# Patient Record
Sex: FEMALE | Race: WHITE | NOT HISPANIC OR LATINO | Employment: OTHER | URBAN - NONMETROPOLITAN AREA
[De-identification: names, ages, dates, MRNs, and addresses within clinical notes are randomized per-mention and may not be internally consistent; named-entity substitution may affect disease eponyms.]

---

## 2017-03-16 ENCOUNTER — OFFICE VISIT (OUTPATIENT)
Dept: CARDIOLOGY | Facility: CLINIC | Age: 77
End: 2017-03-16

## 2017-03-16 VITALS
HEART RATE: 64 BPM | DIASTOLIC BLOOD PRESSURE: 66 MMHG | WEIGHT: 199 LBS | BODY MASS INDEX: 33.97 KG/M2 | HEIGHT: 64 IN | SYSTOLIC BLOOD PRESSURE: 120 MMHG

## 2017-03-16 DIAGNOSIS — R94.39 ABNORMAL STRESS TEST: ICD-10-CM

## 2017-03-16 DIAGNOSIS — I10 ESSENTIAL HYPERTENSION: Primary | ICD-10-CM

## 2017-03-16 DIAGNOSIS — E11.9 TYPE 2 DIABETES MELLITUS WITHOUT COMPLICATION, WITHOUT LONG-TERM CURRENT USE OF INSULIN (HCC): ICD-10-CM

## 2017-03-16 DIAGNOSIS — E78.49 OTHER HYPERLIPIDEMIA: ICD-10-CM

## 2017-03-16 PROBLEM — E78.5 HYPERLIPEMIA: Status: ACTIVE | Noted: 2017-03-16

## 2017-03-16 PROCEDURE — 99213 OFFICE O/P EST LOW 20 MIN: CPT | Performed by: NURSE PRACTITIONER

## 2017-03-16 NOTE — PROGRESS NOTES
Chief Complaint   Patient presents with   • Follow-up     6 month follow-up, denies any cardiac symptoms, patient brought medication list with visit. patient was seen in ER for dehydration recieved fluids and is to follow up with PCP tomorrow.       Cardiac Complaints  none      Subjective   Alena Rojo is a 76 y.o. female with a history of abnormal stress test in the past his cardiac catheterization showed completely normal coronaries. She also had renal angiogram and found to also be normal. Her PA pressure had been elevated but did improve with medication management. In February 2015, she underwent total knee replacement without problems. At her last office visit she reported increased shortness of breath and fatigue. Repeat stress test showed possible anterior wall ischemia for which Imdur was recommended. She was unable to tolerate due to causing body aches.  She comes today for follow up and denies any new cardiac concerns.  She denies any chest pain or palpitations.  She does admit to recently being at [facility] ER for dehydration where she received fluids and was sent home.  Labs and refills she reports with PCP.        Cardiac History  Past Surgical History   Procedure Laterality Date   • Other surgical history  07/29/2010     sleep study   • Cardiovascular stress test  06/02/2010     L. Myoview- (Dr. John) Anterior & Lateral Ischemia   • Echo - converted  06/02/2010     (Dr. Durant) EF 55%. RVSP-70 mmHg   • Cath lab procedure  06/18/2010     Normal coronaries, renals. PA-50mmHg   • Echo - converted  03/25/2013     EF 65% RVSP 38 mmHg   • Cardiovascular stress test  03/14/2016     EF 65%, possible anterior ischemia, imdur added       Current Outpatient Prescriptions   Medication Sig Dispense Refill   • acetaminophen (TYLENOL) 325 MG tablet Take 650 mg by mouth as needed for mild pain (1-3).     • furosemide (LASIX) 40 MG tablet Take 40 mg by mouth daily.     • metFORMIN (GLUCOPHAGE) 500 MG tablet Take 500  "mg by mouth 2 (two) times a day with meals.     • NIFEdipine XL (PROCARDIA XL) 60 MG 24 hr tablet Take 60 mg by mouth daily.     • potassium chloride (K-DUR,KLOR-CON) 10 MEQ CR tablet Take 10 mEq by mouth daily.     • pravastatin (PRAVACHOL) 40 MG tablet Take 40 mg by mouth daily.     • valsartan (DIOVAN) 320 MG tablet Take 320 mg by mouth daily.       No current facility-administered medications for this visit.        Other    Past Medical History   Diagnosis Date   • Arthritis    • Celiac disease/sprue    • H/O colonoscopy      2004- 2005 -- no polyps   • H/O total knee replacement      bilateral   • H/O: hysterectomy    • Hx of cholecystectomy 06/01/2016   • Hypercholesterolemia    • Hyperlipidemia    • Hypertension    • Sleep disorder 07/29/2010     Sleep study       Social History     Social History   • Marital status:      Spouse name: N/A   • Number of children: N/A   • Years of education: N/A     Occupational History   • Not on file.     Social History Main Topics   • Smoking status: Never Smoker   • Smokeless tobacco: Never Used   • Alcohol use No   • Drug use: No   • Sexual activity: Not on file     Other Topics Concern   • Not on file     Social History Narrative       Family History   Problem Relation Age of Onset   • Hypertension Mother    • Cancer Mother    • Heart disease Mother    • Heart attack Father    • Heart disease Father    • Cirrhosis Father    • Hypertension Son    • Hypertension Son        Review of Systems   Constitutional: Negative.    HENT: Negative.    Respiratory: Negative.    Cardiovascular: Negative.    Musculoskeletal: Negative.    Skin: Negative.    Psychiatric/Behavioral: Negative.        DiabetesYes  Thyroidnormal    Objective     Visit Vitals   • /66   • Pulse 64   • Ht 64\" (162.6 cm)   • Wt 199 lb (90.3 kg)   • BMI 34.16 kg/m2       Physical Exam   Constitutional: She is oriented to person, place, and time. She appears well-developed and well-nourished.   HENT: "   Head: Normocephalic and atraumatic.   Eyes: EOM are normal. Pupils are equal, round, and reactive to light.   Neck: Normal range of motion. Neck supple.   Cardiovascular: Normal rate and regular rhythm.    Murmur heard.  Pulmonary/Chest: Effort normal and breath sounds normal.   Abdominal: Soft.   Musculoskeletal: Normal range of motion.   Neurological: She is alert and oriented to person, place, and time.   Skin: Skin is warm and dry.   Psychiatric: She has a normal mood and affect.       Procedures    Assessment/Plan     HR and BP are stable today.  No changes to meds will be made. No new cardiac testing will be advised today as no new concerns are voiced.  Labs and refills she reports with you, could we get next copy please?  No new cardiac testing advised today as no new concerns voiced.  Good cardiac diet and activity as tolerated advised.  6 month follow up scheduled or sooner if needed.      Problems Addressed this Visit        Cardiovascular and Mediastinum    Abnormal stress test    Hyperlipemia    HTN (hypertension) - Primary       Endocrine    Type 2 diabetes mellitus without complication, without long-term current use of insulin                  Electronically signed by PIETRO Guerrero March 16, 2017 4:51 PM

## 2017-09-18 ENCOUNTER — OFFICE VISIT (OUTPATIENT)
Dept: CARDIOLOGY | Facility: CLINIC | Age: 77
End: 2017-09-18

## 2017-09-18 VITALS
DIASTOLIC BLOOD PRESSURE: 68 MMHG | HEART RATE: 52 BPM | WEIGHT: 204 LBS | SYSTOLIC BLOOD PRESSURE: 110 MMHG | BODY MASS INDEX: 34.83 KG/M2 | HEIGHT: 64 IN

## 2017-09-18 DIAGNOSIS — I10 ESSENTIAL HYPERTENSION: Primary | ICD-10-CM

## 2017-09-18 DIAGNOSIS — R94.39 ABNORMAL STRESS TEST: ICD-10-CM

## 2017-09-18 DIAGNOSIS — E78.00 PURE HYPERCHOLESTEROLEMIA: ICD-10-CM

## 2017-09-18 DIAGNOSIS — R60.0 LOCALIZED EDEMA: ICD-10-CM

## 2017-09-18 PROCEDURE — 99213 OFFICE O/P EST LOW 20 MIN: CPT | Performed by: NURSE PRACTITIONER

## 2017-09-18 RX ORDER — DICYCLOMINE HCL 20 MG
20 TABLET ORAL DAILY
COMMUNITY
End: 2018-09-18 | Stop reason: ALTCHOICE

## 2017-09-18 NOTE — PROGRESS NOTES
Chief Complaint   Patient presents with   • Follow-up     Denies chest pain, shortness of breath, or palpitations. PCP refills meds. Labs per nephrologist in August.        Subjective       Alena Rojo is a 76 y.o. female with a history of abnormal stress test in the past followed by cardiac catheterization taht showed completely normal coronaries. She also had renal angiogram and found to also be normal. Her PA pressure had been elevated but did improve with medication management. In February 2015, she underwent total knee replacement without problems. She later reported increased shortness of breath and fatigue. A stress test was repeated in 2016 that showed possible anterior wall ischemia for which Imdur was recommended. She was unable to tolerate due to causing body aches. At her last office visit she felt she was doing well and continued conservative management.   Today she comes to the office for a follow up appointment and denies chest pain, palpitations or significant shortness of breath. Apparently, after walking back to her house from her mailbox is up grade and she develops shortness of breath for which she has to stop one time and rest, but this is not a new symptom. No recent medication changes reported. She also has mild edema of her lower legs, which again is not a new symptom. After daily dose of Lasix the edema improves.     HPI         Cardiac History:    Past Surgical History:   Procedure Laterality Date   • CARDIOVASCULAR STRESS TEST  06/02/2010    NANY Lazaroview- (Dr. John) Anterior & Lateral Ischemia   • CARDIOVASCULAR STRESS TEST  03/14/2016    EF 65%, possible anterior ischemia, imdur added   • CATH LAB PROCEDURE  06/18/2010    Normal coronaries, renals. PA-50mmHg   • ECHO - CONVERTED  06/02/2010    (Dr. Durant) EF 55%. RVSP-70 mmHg   • ECHO - CONVERTED  03/25/2013    EF 65% RVSP 38 mmHg   • OTHER SURGICAL HISTORY  07/29/2010    sleep study       Current Outpatient Prescriptions   Medication  Sig Dispense Refill   • acetaminophen (TYLENOL) 325 MG tablet Take 650 mg by mouth as needed for mild pain (1-3).     • dicyclomine (BENTYL) 20 MG tablet Take 20 mg by mouth Daily.     • furosemide (LASIX) 40 MG tablet Take 40 mg by mouth daily.     • Loperamide HCl (IMODIUM A-D PO) Take  by mouth Daily.     • metFORMIN (GLUCOPHAGE) 500 MG tablet Take 500 mg by mouth 2 (two) times a day with meals.     • NIFEdipine XL (PROCARDIA XL) 60 MG 24 hr tablet Take 60 mg by mouth daily.     • potassium chloride (K-DUR,KLOR-CON) 10 MEQ CR tablet Take 10 mEq by mouth daily.     • pravastatin (PRAVACHOL) 40 MG tablet Take 40 mg by mouth daily.     • valsartan (DIOVAN) 320 MG tablet Take 320 mg by mouth daily.       No current facility-administered medications for this visit.        Other    Past Medical History:   Diagnosis Date   • Arthritis    • Celiac disease/sprue    • H/O colonoscopy     2004- 2005 -- no polyps   • H/O total knee replacement     bilateral   • H/O: hysterectomy    • Hx of cholecystectomy 06/01/2016   • Hypercholesterolemia    • Hyperlipidemia    • Hypertension    • Sleep disorder 07/29/2010    Sleep study       Social History     Social History   • Marital status:      Spouse name: N/A   • Number of children: N/A   • Years of education: N/A     Occupational History   • Not on file.     Social History Main Topics   • Smoking status: Never Smoker   • Smokeless tobacco: Never Used   • Alcohol use No   • Drug use: No   • Sexual activity: Not on file     Other Topics Concern   • Not on file     Social History Narrative       Family History   Problem Relation Age of Onset   • Hypertension Mother    • Cancer Mother    • Heart disease Mother    • Heart attack Father    • Heart disease Father    • Cirrhosis Father    • Hypertension Son    • Hypertension Son        Review of Systems   Constitution: Negative for decreased appetite, weakness and malaise/fatigue.   HENT: Negative for congestion and sore throat.   "  Eyes: Negative for blurred vision.   Cardiovascular: Positive for dyspnea on exertion and leg swelling. Negative for chest pain, near-syncope and palpitations.   Respiratory: Negative for shortness of breath and sleep disturbances due to breathing.    Endocrine: Negative for cold intolerance and heat intolerance.   Hematologic/Lymphatic: Negative for bleeding problem. Does not bruise/bleed easily.   Skin: Negative for itching and nail changes.   Musculoskeletal: Positive for arthritis. Negative for falls and myalgias.   Gastrointestinal: Positive for diarrhea (history celiac disease). Negative for abdominal pain, dysphagia, heartburn and nausea.   Genitourinary: Negative for dysuria, frequency and hematuria.   Neurological: Negative for dizziness, light-headedness, seizures and vertigo.   Psychiatric/Behavioral: Negative for altered mental status.   Allergic/Immunologic: Negative for hives and persistent infections.    Diabetes- Yes  Thyroid-normal    Objective     /68  Pulse 52  Ht 64\" (162.6 cm)  Wt 204 lb (92.5 kg)  BMI 35.02 kg/m2    Physical Exam   Constitutional: She is oriented to person, place, and time. She appears well-nourished.   HENT:   Head: Normocephalic.   Eyes: Pupils are equal, round, and reactive to light.   Neck: Normal range of motion. Neck supple. No JVD present. Carotid bruit is not present. No thyromegaly present.   Cardiovascular: Normal rate, regular rhythm, S1 normal and S2 normal.    No murmur heard.  Pulmonary/Chest: Breath sounds normal. She has no wheezes. She has no rales.   Abdominal: Soft. Bowel sounds are normal. She exhibits no distension. There is no tenderness.   Musculoskeletal: Normal range of motion. She exhibits no edema.   Neurological: She is alert and oriented to person, place, and time.   Skin: Skin is warm and dry. No rash noted. No pallor.   Psychiatric: She has a normal mood and affect. Her behavior is normal. Judgment and thought content normal.    " Procedures          Assessment/Plan      Alena was seen today for follow-up.    Diagnoses and all orders for this visit:    Essential hypertension    Pure hypercholesterolemia    Abnormal stress test    Localized edema      Ms. Rojo denies new or worsening cardiac symtpoms. Her blood pressure is normal. Her heart rate is 52 and I have asked her to periodically monitor heart rate and call if remains her normal 50-60 resting heart rate. We reviewed her last stress which showed an area of ischemia being managed medically. She admits to having nitrostat, but has not had to take. Should any problems develop she understands to call or go to emergency department. She follows with you for management of labs. I encouraged her on good diet and activity. We will see her back in 6 months or sooner for problems.               Electronically signed by PIETRO Puga,  September 18, 2017 4:11 PM

## 2018-03-19 ENCOUNTER — OFFICE VISIT (OUTPATIENT)
Dept: CARDIOLOGY | Facility: CLINIC | Age: 78
End: 2018-03-19

## 2018-03-19 VITALS
BODY MASS INDEX: 35.51 KG/M2 | SYSTOLIC BLOOD PRESSURE: 108 MMHG | HEIGHT: 64 IN | DIASTOLIC BLOOD PRESSURE: 60 MMHG | HEART RATE: 72 BPM | WEIGHT: 208 LBS

## 2018-03-19 DIAGNOSIS — I10 ESSENTIAL HYPERTENSION: Primary | ICD-10-CM

## 2018-03-19 DIAGNOSIS — I27.20 PHT (PULMONARY HYPERTENSION) (HCC): ICD-10-CM

## 2018-03-19 DIAGNOSIS — K90.0 CELIAC DISEASE: ICD-10-CM

## 2018-03-19 DIAGNOSIS — E11.9 TYPE 2 DIABETES MELLITUS WITHOUT COMPLICATION, WITHOUT LONG-TERM CURRENT USE OF INSULIN (HCC): ICD-10-CM

## 2018-03-19 DIAGNOSIS — R01.1 MURMUR, CARDIAC: ICD-10-CM

## 2018-03-19 DIAGNOSIS — E78.00 HYPERCHOLESTEREMIA: ICD-10-CM

## 2018-03-19 DIAGNOSIS — E88.81 METABOLIC SYNDROME: ICD-10-CM

## 2018-03-19 PROBLEM — E78.5 HYPERLIPEMIA: Status: RESOLVED | Noted: 2017-03-16 | Resolved: 2018-03-19

## 2018-03-19 PROBLEM — E88.810 METABOLIC SYNDROME: Status: ACTIVE | Noted: 2018-03-19

## 2018-03-19 PROCEDURE — 99213 OFFICE O/P EST LOW 20 MIN: CPT | Performed by: INTERNAL MEDICINE

## 2018-03-19 NOTE — PROGRESS NOTES
Chief Complaint   Patient presents with   • Follow-up     for cardiac management   • Med Refill     PCP writes refills and monitors labs       CARDIAC COMPLAINTS  dyspnea and fatigue        Subjective   Alena Rojo is a 77 y.o. female came today for her follow-up visit.  She has history of hypertension, diabetes, hypercholesterolemia and metabolic syndrome.  She also has moderate pulmonary hypertension.  She came today feeling little tired and weak.  She denies having any chest pain.  She did develop shingles few weeks ago.  It was mostly on the forehead and in the scalp.  Her lab work is followed at your office.  Her major problem is frequent diarrhea secondary to celiac disease.              Cardiac History  Past Surgical History:   Procedure Laterality Date   • CARDIAC CATHETERIZATION  06/18/2010    Normal coronaries, renals. PA-50mmHg   • CARDIOVASCULAR STRESS TEST  06/02/2010    L. Myoview- (Dr. John) Anterior & Lateral Ischemia   • CARDIOVASCULAR STRESS TEST  03/14/2016    EF 65%, possible anterior ischemia, imdur added   • ECHO - CONVERTED  06/02/2010    (Dr. Durant) EF 55%. RVSP-70 mmHg   • ECHO - CONVERTED  03/25/2013    EF 65% RVSP 38 mmHg   • OTHER SURGICAL HISTORY  07/29/2010    sleep study       Current Outpatient Prescriptions   Medication Sig Dispense Refill   • acetaminophen (TYLENOL) 325 MG tablet Take 650 mg by mouth as needed for mild pain (1-3).     • dicyclomine (BENTYL) 20 MG tablet Take 20 mg by mouth Daily.     • furosemide (LASIX) 40 MG tablet Take 40 mg by mouth daily.     • Loperamide HCl (IMODIUM A-D PO) Take  by mouth Daily.     • metFORMIN (GLUCOPHAGE) 500 MG tablet Take 500 mg by mouth 2 (two) times a day with meals.     • NIFEdipine XL (PROCARDIA XL) 60 MG 24 hr tablet Take 60 mg by mouth daily.     • potassium chloride (K-DUR,KLOR-CON) 10 MEQ CR tablet Take 10 mEq by mouth daily.     • pravastatin (PRAVACHOL) 40 MG tablet Take 40 mg by mouth daily.     • valsartan (DIOVAN) 320  MG tablet Take 320 mg by mouth daily.       No current facility-administered medications for this visit.        Allergies  :  Other       Past Medical History:   Diagnosis Date   • Arthritis    • Celiac disease/sprue    • H/O colonoscopy     2004- 2005 -- no polyps   • H/O total knee replacement     bilateral   • H/O: hysterectomy    • Hx of cholecystectomy 06/01/2016   • Hypercholesterolemia    • Hyperlipidemia    • Hypertension    • Sleep disorder 07/29/2010    Sleep study       Social History     Social History   • Marital status:      Spouse name: N/A   • Number of children: N/A   • Years of education: N/A     Occupational History   • Not on file.     Social History Main Topics   • Smoking status: Never Smoker   • Smokeless tobacco: Never Used   • Alcohol use No   • Drug use: No   • Sexual activity: Not on file     Other Topics Concern   • Not on file     Social History Narrative   • No narrative on file       Family History   Problem Relation Age of Onset   • Hypertension Mother    • Cancer Mother    • Heart disease Mother    • Heart attack Father    • Heart disease Father    • Cirrhosis Father    • Hypertension Son    • Hypertension Son        Review of Systems   Constitution: Positive for malaise/fatigue. Negative for decreased appetite.   HENT: Negative for congestion and sore throat.    Eyes: Negative for blurred vision.   Cardiovascular: Positive for dyspnea on exertion. Negative for chest pain and palpitations.   Respiratory: Positive for shortness of breath and snoring.    Endocrine: Negative for cold intolerance and heat intolerance.   Hematologic/Lymphatic: Negative for adenopathy. Does not bruise/bleed easily.   Skin: Positive for rash. Negative for itching, nail changes and skin cancer.   Musculoskeletal: Negative for arthritis and myalgias.   Gastrointestinal: Positive for diarrhea and flatus. Negative for abdominal pain, dysphagia and heartburn.   Genitourinary: Negative for bladder  "incontinence and frequency.   Neurological: Negative for dizziness, light-headedness, seizures and vertigo.   Psychiatric/Behavioral: Negative for altered mental status.   Allergic/Immunologic: Negative for environmental allergies and hives.       Diabetes- Yes  Thyroid- normal    Objective     /60   Pulse 72   Ht 162.6 cm (64\")   Wt 94.3 kg (208 lb)   BMI 35.70 kg/m²     Physical Exam   Constitutional: She is oriented to person, place, and time. She appears well-developed and well-nourished.   HENT:   Head: Normocephalic.   Nose: Nose normal.   Eyes: EOM are normal. Pupils are equal, round, and reactive to light.   Neck: Normal range of motion. Neck supple.   Cardiovascular: Normal rate, regular rhythm, S1 normal and S2 normal.    Murmur heard.  Pulmonary/Chest: Effort normal and breath sounds normal.   Abdominal: Soft. Bowel sounds are normal.   Musculoskeletal: Normal range of motion. She exhibits edema.   Neurological: She is alert and oriented to person, place, and time.   Skin: Skin is warm and dry.   Psychiatric: She has a normal mood and affect.     Procedures            Assessment/Plan     Alena was seen today for follow-up and med refill.    Diagnoses and all orders for this visit:    Essential hypertension    Hypercholesteremia    Type 2 diabetes mellitus without complication, without long-term current use of insulin    Metabolic syndrome    Murmur, cardiac    Celiac disease    PHT (pulmonary hypertension)       Her heart rate and blood pressure appears stable.  Her BMI is still elevated.  At this time continue the same medication.  Advised her to talk with her gastroenterologist regarding the diarrhea.  Overall her cardiac status appears stable.  I explained to her about the previous stress test.  As long she is not having any significant chest pain, continue medication.  During her next visit, she may need an echocardiogram to evaluate her PA pressure.                  Electronically signed by " Jo Coburn MD March 19, 2018 3:58 PM

## 2018-09-18 ENCOUNTER — OFFICE VISIT (OUTPATIENT)
Dept: CARDIOLOGY | Facility: CLINIC | Age: 78
End: 2018-09-18

## 2018-09-18 VITALS
DIASTOLIC BLOOD PRESSURE: 72 MMHG | SYSTOLIC BLOOD PRESSURE: 112 MMHG | BODY MASS INDEX: 35.17 KG/M2 | HEIGHT: 64 IN | HEART RATE: 60 BPM | WEIGHT: 206 LBS

## 2018-09-18 DIAGNOSIS — E11.9 TYPE 2 DIABETES MELLITUS WITHOUT COMPLICATION, WITHOUT LONG-TERM CURRENT USE OF INSULIN (HCC): ICD-10-CM

## 2018-09-18 DIAGNOSIS — I10 ESSENTIAL HYPERTENSION: Primary | ICD-10-CM

## 2018-09-18 DIAGNOSIS — R94.39 ABNORMAL STRESS TEST: ICD-10-CM

## 2018-09-18 DIAGNOSIS — I27.20 PHT (PULMONARY HYPERTENSION) (HCC): ICD-10-CM

## 2018-09-18 DIAGNOSIS — E78.00 HYPERCHOLESTEREMIA: ICD-10-CM

## 2018-09-18 DIAGNOSIS — E88.81 METABOLIC SYNDROME: ICD-10-CM

## 2018-09-18 PROCEDURE — 99213 OFFICE O/P EST LOW 20 MIN: CPT | Performed by: NURSE PRACTITIONER

## 2018-09-18 NOTE — PROGRESS NOTES
"Chief Complaint   Patient presents with   • Follow-up     Cardiac management. She reports last week she just felt tired, doing better this week. Last labs a week ago per PCP. PCP writes refills on medication.   • Dizziness     She states \"just occasional not often\", short in duration.       Subjective       Alena Rojo is a 77 y.o. female with a history of hypertension, hyperlipidemia, diabetes, and metabolic syndrome. Cardiac work up in 2010 showed normal coronaries, normal renals with elevated PA pressure. She developed more shortness of breath and fatigue and cardiac work up repeated in 2016 showed possible anterior ischemia. Imdur was added but unable to tolerate secondary to body aches. She takes lasix for LE edema. Today she came for follow up. Overall, she feels well. Mild fatigue, shortness of breath only with significant exertion. She denies chest pain. Labs checked last week with Dr. Varghese were reported as great.     HPI         Cardiac History:    Past Surgical History:   Procedure Laterality Date   • CARDIAC CATHETERIZATION  06/18/2010    Normal coronaries, renals. PA-50mmHg   • CARDIOVASCULAR STRESS TEST  06/02/2010    L. Myoview- (Dr. John) Anterior & Lateral Ischemia   • CARDIOVASCULAR STRESS TEST  03/14/2016    EF 65%, possible anterior ischemia, imdur added   • ECHO - CONVERTED  06/02/2010    (Dr. Durant) EF 55%. RVSP-70 mmHg   • ECHO - CONVERTED  03/25/2013    EF 65% RVSP 38 mmHg   • OTHER SURGICAL HISTORY  07/29/2010    sleep study       Current Outpatient Prescriptions   Medication Sig Dispense Refill   • acetaminophen (TYLENOL) 325 MG tablet Take 650 mg by mouth as needed for mild pain (1-3).     • furosemide (LASIX) 40 MG tablet Take 40 mg by mouth daily.     • Loperamide HCl (IMODIUM A-D PO) Take  by mouth Daily.     • metFORMIN (GLUCOPHAGE) 500 MG tablet Take 500 mg by mouth 2 (two) times a day with meals.     • NIFEdipine XL (PROCARDIA XL) 60 MG 24 hr tablet Take 60 mg by mouth daily. "     • potassium chloride (K-DUR,KLOR-CON) 10 MEQ CR tablet Take 10 mEq by mouth daily.     • pravastatin (PRAVACHOL) 40 MG tablet Take 40 mg by mouth daily.     • valsartan (DIOVAN) 320 MG tablet Take 320 mg by mouth daily.       No current facility-administered medications for this visit.        Other    Past Medical History:   Diagnosis Date   • Arthritis    • Celiac disease/sprue    • H/O colonoscopy     2004- 2005 -- no polyps   • H/O total knee replacement     bilateral   • H/O: hysterectomy    • Hx of cholecystectomy 06/01/2016   • Hypercholesterolemia    • Hyperlipidemia    • Hypertension    • Sleep disorder 07/29/2010    Sleep study       Social History     Social History   • Marital status:      Spouse name: N/A   • Number of children: N/A   • Years of education: N/A     Occupational History   • Not on file.     Social History Main Topics   • Smoking status: Never Smoker   • Smokeless tobacco: Never Used   • Alcohol use No   • Drug use: No   • Sexual activity: Not on file     Other Topics Concern   • Not on file     Social History Narrative   • No narrative on file       Family History   Problem Relation Age of Onset   • Hypertension Mother    • Cancer Mother    • Heart disease Mother    • Heart attack Father    • Heart disease Father    • Cirrhosis Father    • Hypertension Son    • Hypertension Son        Review of Systems   Constitution: Positive for weight loss (down 2 lb ). Negative for decreased appetite, weakness and malaise/fatigue.   HENT: Negative.    Eyes: Negative.    Cardiovascular: Positive for dyspnea on exertion (significant exertion only, climbing hill) and leg swelling (mild ). Negative for chest pain, near-syncope, palpitations and syncope.   Respiratory: Negative for cough and shortness of breath.    Endocrine: Negative.    Hematologic/Lymphatic: Negative.    Skin: Negative.    Musculoskeletal: Negative for falls and myalgias.   Gastrointestinal: Positive for diarrhea (occasional).  "Negative for abdominal pain, constipation and melena.   Genitourinary: Negative for dysuria and hematuria.   Neurological: Negative for dizziness.   Psychiatric/Behavioral: Negative for altered mental status and depression.   Allergic/Immunologic: Negative.       Diabetes- Yes  Thyroid-normal    Objective     /72 (BP Location: Left arm)   Pulse 60   Ht 162.6 cm (64.02\")   Wt 93.4 kg (206 lb)   BMI 35.34 kg/m²     Physical Exam   Constitutional: She is oriented to person, place, and time. She appears well-developed and well-nourished.   HENT:   Head: Normocephalic.   Eyes: Pupils are equal, round, and reactive to light.   Neck: Normal range of motion.   Cardiovascular: Normal rate, regular rhythm and intact distal pulses.    Pulmonary/Chest: Effort normal and breath sounds normal. No respiratory distress. She has no wheezes.   Abdominal: Soft. Bowel sounds are normal.   Musculoskeletal: Normal range of motion. She exhibits no edema.   Neurological: She is alert and oriented to person, place, and time.   Skin: Skin is warm and dry.   Psychiatric: She has a normal mood and affect.   Nursing note and vitals reviewed.     Procedures          Assessment/Plan    Heart rate and blood pressure well controlled. We reviewed the findings of most recent stress. She remains asymptomatic, so no changes made. Consider repeating echocardiogram to evaluate PA pressure as this has been elevated in the past. She prefers to wait since her symptoms of FOREMAN are well controlled. Labs are followed with Dr. Varghese. She tolerates pravastatin without side effects. BMI is elevated. We discussed heart healthy diet. Literature given on Mediterranean diet. She was encouraged to walk a few minutes every day to increase effort tolerance. She appears stable. We will see her back in six months or sooner if needed.    Alena was seen today for follow-up and dizziness.    Diagnoses and all orders for this visit:    Essential " hypertension    Hypercholesteremia    Abnormal stress test    PHT (pulmonary hypertension)    Type 2 diabetes mellitus without complication, without long-term current use of insulin (CMS/[facility])    Metabolic syndrome        Patient's Body mass index is 35.34 kg/m². BMI is above normal parameters. Recommendations include: nutrition counseling.              Electronically signed by PIETRO Tran,  September 18, 2018 5:13 PM

## 2018-09-18 NOTE — PATIENT INSTRUCTIONS
"Fat and Cholesterol Restricted Diet  Getting too much fat and cholesterol in your diet may cause health problems. Following this diet helps keep your fat and cholesterol at normal levels. This can keep you from getting sick.  What types of fat should I choose?  · Choose monosaturated and polyunsaturated fats. These are found in foods such as olive oil, canola oil, flaxseeds, walnuts, almonds, and seeds.  · Eat more omega-3 fats. Good choices include salmon, mackerel, sardines, tuna, flaxseed oil, and ground flaxseeds.  · Limit saturated fats. These are in animal products such as meats, butter, and cream. They can also be in plant products such as palm oil, palm kernel oil, and coconut oil.  · Avoid foods with partially hydrogenated oils in them. These contain trans fats. Examples of foods that have trans fats are stick margarine, some tub margarines, cookies, crackers, and other baked goods.  What general guidelines do I need to follow?  · Check food labels. Look for the words \"trans fat\" and \"saturated fat.\"  · When preparing a meal:  ? Fill half of your plate with vegetables and green salads.  ? Fill one fourth of your plate with whole grains. Look for the word \"whole\" as the first word in the ingredient list.  ? Fill one fourth of your plate with lean protein foods.  · Eat more foods that have fiber, like apples, carrots, beans, peas, and barley.  · Eat more home-cooked foods. Eat less at restaurants and buffets.  · Limit or avoid alcohol.  · Limit foods high in starch and sugar.  · Limit fried foods.  · Cook foods without frying them. Baking, boiling, grilling, and broiling are all great options.  · Lose weight if you are overweight. Losing even a small amount of weight can help your overall health. It can also help prevent diseases such as diabetes and heart disease.  What foods can I eat?  Grains  Whole grains, such as whole wheat or whole grain breads, crackers, cereals, and pasta. Unsweetened oatmeal, " bulgur, barley, quinoa, or brown rice. Corn or whole wheat flour tortillas.  Vegetables  Fresh or frozen vegetables (raw, steamed, roasted, or grilled). Green salads.  Fruits  All fresh, canned (in natural juice), or frozen fruits.  Meat and Other Protein Products  Ground beef (85% or leaner), grass-fed beef, or beef trimmed of fat. Skinless chicken or turkey. Ground chicken or turkey. Pork trimmed of fat. All fish and seafood. Eggs. Dried beans, peas, or lentils. Unsalted nuts or seeds. Unsalted canned or dry beans.  Dairy  Low-fat dairy products, such as skim or 1% milk, 2% or reduced-fat cheeses, low-fat ricotta or cottage cheese, or plain low-fat yogurt.  Fats and Oils  Tub margarines without trans fats. Light or reduced-fat mayonnaise and salad dressings. Avocado. Olive, canola, sesame, or safflower oils. Natural peanut or almond butter (choose ones without added sugar and oil).  The items listed above may not be a complete list of recommended foods or beverages. Contact your dietitian for more options.  What foods are not recommended?  Grains  White bread. White pasta. White rice. Cornbread. Bagels, pastries, and croissants. Crackers that contain trans fat.  Vegetables  White potatoes. Corn. Creamed or fried vegetables. Vegetables in a cheese sauce.  Fruits  Dried fruits. Canned fruit in light or heavy syrup. Fruit juice.  Meat and Other Protein Products  Fatty cuts of meat. Ribs, chicken wings, palomo, sausage, bologna, salami, chitterlings, fatback, hot dogs, bratwurst, and packaged luncheon meats. Liver and organ meats.  Dairy  Whole or 2% milk, cream, half-and-half, and cream cheese. Whole milk cheeses. Whole-fat or sweetened yogurt. Full-fat cheeses. Nondairy creamers and whipped toppings. Processed cheese, cheese spreads, or cheese curds.  Sweets and Desserts  Corn syrup, sugars, honey, and molasses. Candy. Jam and jelly. Syrup. Sweetened cereals. Cookies, pies, cakes, donuts, muffins, and ice  cream.  Fats and Oils  Butter, stick margarine, lard, shortening, ghee, or palomo fat. Coconut, palm kernel, or palm oils.  Beverages  Alcohol. Sweetened drinks (such as sodas, lemonade, and fruit drinks or punches).  The items listed above may not be a complete list of foods and beverages to avoid. Contact your dietitian for more information.  This information is not intended to replace advice given to you by your health care provider. Make sure you discuss any questions you have with your health care provider.  Document Released: 06/18/2013 Document Revised: 08/24/2017 Document Reviewed: 03/19/2015  [employer] Interactive Patient Education © 2018 [employer]

## 2019-03-19 ENCOUNTER — OFFICE VISIT (OUTPATIENT)
Dept: CARDIOLOGY | Facility: CLINIC | Age: 79
End: 2019-03-19

## 2019-03-19 VITALS
HEART RATE: 64 BPM | SYSTOLIC BLOOD PRESSURE: 112 MMHG | HEIGHT: 64 IN | BODY MASS INDEX: 37.22 KG/M2 | DIASTOLIC BLOOD PRESSURE: 60 MMHG | WEIGHT: 218 LBS

## 2019-03-19 DIAGNOSIS — I10 ESSENTIAL HYPERTENSION: Primary | ICD-10-CM

## 2019-03-19 DIAGNOSIS — E78.00 HYPERCHOLESTEREMIA: ICD-10-CM

## 2019-03-19 DIAGNOSIS — R01.1 MURMUR, CARDIAC: ICD-10-CM

## 2019-03-19 DIAGNOSIS — E66.9 OBESITY (BMI 30-39.9): ICD-10-CM

## 2019-03-19 DIAGNOSIS — R06.02 SHORTNESS OF BREATH: ICD-10-CM

## 2019-03-19 DIAGNOSIS — I27.20 PHT (PULMONARY HYPERTENSION) (HCC): ICD-10-CM

## 2019-03-19 PROCEDURE — 99213 OFFICE O/P EST LOW 20 MIN: CPT | Performed by: NURSE PRACTITIONER

## 2019-03-19 RX ORDER — ASPIRIN 81 MG/1
81 TABLET ORAL DAILY
COMMUNITY
End: 2022-07-12

## 2019-03-19 RX ORDER — LISINOPRIL 20 MG/1
20 TABLET ORAL DAILY
Qty: 30 TABLET | Refills: 11 | Status: SHIPPED | OUTPATIENT
Start: 2019-03-19 | End: 2020-04-01

## 2019-03-19 NOTE — PROGRESS NOTES
Chief Complaint   Patient presents with   • Follow-up     cardiac management.  no recent labs.  pt due to have labs with PCP in May.  pt had carotid ultrasound Oct. 2018 ordered by Dr. Kelly secondary to double vision episode.  per pt u/s was normal.  pt has some chest heaviness with moderate exertion (walking incline)  she states it is not bad.   • Med Refill     pt brought med list.  Since last visit, PCP changed valsartan to irbesartan because recall, now irbesartan not available.  She had no irbesartan yesterday and today she took extra procardia this AM.         Negin Rojo is a 78 y.o. female  with a history of hypertension, hyperlipidemia, diabetes, and metabolic syndrome. Cardiac work up in 2010 showed normal coronaries, normal renals with elevated PA pressure. She developed more shortness of breath and fatigue and cardiac work up repeated in 2016 showed possible anterior ischemia. Imdur was added but unable to tolerate secondary to body aches. She takes lasix for LE edema.     Today she comes to the office for a follow-up visit.  She denies chest pain or palpitations.  She does admit to some chest heaviness only with significant exertion such as walking up a steep incline.  She will also experience shortness of breath with such activity.  Due to medication recall she has not been taking valsartan.    HPI     Cardiac History:    Past Surgical History:   Procedure Laterality Date   • CARDIAC CATHETERIZATION  06/18/2010    Normal coronaries, renals. PA-50mmHg   • CARDIOVASCULAR STRESS TEST  06/02/2010    L. Myoview- (Dr. John) Anterior & Lateral Ischemia   • CARDIOVASCULAR STRESS TEST  03/14/2016    EF 65%, possible anterior ischemia, imdur added   • ECHO - CONVERTED  06/02/2010    (Dr. Durant) EF 55%. RVSP-70 mmHg   • ECHO - CONVERTED  03/25/2013    EF 65% RVSP 38 mmHg   • OTHER SURGICAL HISTORY  07/29/2010    sleep study       Current Outpatient Medications   Medication Sig Dispense  Refill   • acetaminophen (TYLENOL) 325 MG tablet Take 650 mg by mouth as needed for mild pain (1-3).     • aspirin 81 MG EC tablet Take 81 mg by mouth Daily.     • furosemide (LASIX) 40 MG tablet Take 40 mg by mouth daily.     • Loperamide HCl (IMODIUM A-D PO) Take  by mouth As Needed.     • NIFEdipine XL (PROCARDIA XL) 60 MG 24 hr tablet Take 60 mg by mouth daily.     • potassium chloride (K-DUR,KLOR-CON) 10 MEQ CR tablet Take 10 mEq by mouth daily.     • pravastatin (PRAVACHOL) 40 MG tablet Take 40 mg by mouth daily.     • SITagliptin (JANUVIA) 100 MG tablet Take 100 mg by mouth Daily.     • lisinopril (PRINIVIL,ZESTRIL) 20 MG tablet Take 1 tablet by mouth Daily. 30 tablet 11     No current facility-administered medications for this visit.        Other    Past Medical History:   Diagnosis Date   • Arthritis    • Celiac disease/sprue    • H/O colonoscopy     2004- 2005 -- no polyps   • H/O total knee replacement     bilateral   • H/O: hysterectomy    • Hx of cholecystectomy 06/01/2016   • Hypercholesterolemia    • Hyperlipidemia    • Hypertension    • Sleep disorder 07/29/2010    Sleep study       Social History     Socioeconomic History   • Marital status:      Spouse name: Not on file   • Number of children: Not on file   • Years of education: Not on file   • Highest education level: Not on file   Social Needs   • Financial resource strain: Not on file   • Food insecurity - worry: Not on file   • Food insecurity - inability: Not on file   • Transportation needs - medical: Not on file   • Transportation needs - non-medical: Not on file   Occupational History   • Not on file   Tobacco Use   • Smoking status: Never Smoker   • Smokeless tobacco: Never Used   Substance and Sexual Activity   • Alcohol use: No   • Drug use: No   • Sexual activity: Not on file   Other Topics Concern   • Not on file   Social History Narrative   • Not on file       Family History   Problem Relation Age of Onset   • Hypertension  "Mother    • Cancer Mother    • Heart disease Mother    • Heart attack Father    • Heart disease Father    • Cirrhosis Father    • Hypertension Son    • Hypertension Son        Review of Systems   Constitution: Negative for decreased appetite and weakness.   HENT: Negative for congestion, hoarse voice and nosebleeds.    Eyes: Negative for redness and visual disturbance.   Cardiovascular: Positive for dyspnea on exertion and leg swelling (mild, not a new problem). Negative for chest pain, near-syncope and palpitations.   Respiratory: Positive for shortness of breath (with exertion). Negative for sleep disturbances due to breathing.    Endocrine: Negative for polydipsia, polyphagia and polyuria.   Hematologic/Lymphatic: Negative for bleeding problem. Does not bruise/bleed easily.   Skin: Negative for color change, dry skin and itching.   Musculoskeletal: Negative for falls, muscle cramps and muscle weakness.   Gastrointestinal: Negative for change in bowel habit, dysphagia and melena.   Genitourinary: Negative for dysuria and hematuria.   Neurological: Negative for dizziness and headaches.   Psychiatric/Behavioral: Negative for memory loss. The patient does not have insomnia and is not nervous/anxious.         Objective     /60 (BP Location: Left arm, Patient Position: Sitting, Cuff Size: Large Adult)   Pulse 64   Ht 162.6 cm (64\")   Wt 98.9 kg (218 lb)   BMI 37.42 kg/m²     Physical Exam   Constitutional: She is oriented to person, place, and time. She appears well-nourished.   HENT:   Head: Normocephalic.   Eyes: Pupils are equal, round, and reactive to light.   Neck: Normal range of motion. Neck supple. Carotid bruit is not present.   Cardiovascular: Normal rate, regular rhythm, S1 normal and normal pulses.   Murmur heard.   Systolic murmur is present with a grade of 2/6.  Loud S2   Pulmonary/Chest: Breath sounds normal.   Abdominal: Soft. Bowel sounds are normal. She exhibits no distension. There is no " tenderness.   Musculoskeletal: Normal range of motion. She exhibits edema (mild in lower legs).   Neurological: She is alert and oriented to person, place, and time.   Skin: Skin is warm and dry. No pallor.   Psychiatric: She has a normal mood and affect. Her behavior is normal.        Procedures: none today        Assessment/Plan      Alena was seen today for follow-up and med refill.    Diagnoses and all orders for this visit:    Essential hypertension    Hypercholesteremia    PHT (pulmonary hypertension) (CMS/HCC)    Murmur, cardiac    Obesity (BMI 30-39.9)    Shortness of breath    Other orders  -     lisinopril (PRINIVIL,ZESTRIL) 20 MG tablet; Take 1 tablet by mouth Daily.    Her blood pressure today is in normal range but she did take an extra dose of Procardia.  To replace valsartan we will try lisinopril and a prescription given.  Should she develop a cough or any issues she understands to call.    She follows with you for lab orders and plans to have labs repeated in the near future.  Please forward a copy when available.  For cholesterol management she is taking pravastatin.  She will continue the same with further recommendations based on lab results.    Patient's Body mass index is 37.42 kg/m². BMI is above normal parameters. Recommendations include: nutrition counseling.  Diet for weight loss encouraged.      Ms. Rojo denies cardiac concerns.  She does admit to mild chest heaviness and increased shortness of breath with moderate to significant exertion exertion such as walking up a steep hill.  We discussed repeating cardiac workup at this time.  Since she does not feel her symptoms are any worse, she declines testing at this time.  At next visit we will again discuss repeat cardiac workup especially echocardiogram to reassess PA pressure.    A 6-month follow-up visit scheduled.  Please call sooner for any cardiac concerns.           Electronically signed by Janet Solomon, PIETRO,  March 19, 2019  1:35 PM

## 2019-03-19 NOTE — PATIENT INSTRUCTIONS
Calorie Counting for Weight Loss  Calories are units of energy. Your body needs a certain amount of calories from food to keep you going throughout the day. When you eat more calories than your body needs, your body stores the extra calories as fat. When you eat fewer calories than your body needs, your body burns fat to get the energy it needs.  Calorie counting means keeping track of how many calories you eat and drink each day. Calorie counting can be helpful if you need to lose weight. If you make sure to eat fewer calories than your body needs, you should lose weight. Ask your health care provider what a healthy weight is for you.  For calorie counting to work, you will need to eat the right number of calories in a day in order to lose a healthy amount of weight per week. A dietitian can help you determine how many calories you need in a day and will give you suggestions on how to reach your calorie goal.  · A healthy amount of weight to lose per week is usually 1-2 lb (0.5-0.9 kg). This usually means that your daily calorie intake should be reduced by 500-750 calories.  · Eating 1,200 - 1,500 calories per day can help most women lose weight.  · Eating 1,500 - 1,800 calories per day can help most men lose weight.    What is my plan?  My goal is to have __________ calories per day.  If I have this many calories per day, I should lose around __________ pounds per week.  What do I need to know about calorie counting?  In order to meet your daily calorie goal, you will need to:  · Find out how many calories are in each food you would like to eat. Try to do this before you eat.  · Decide how much of the food you plan to eat.  · Write down what you ate and how many calories it had. Doing this is called keeping a food log.    To successfully lose weight, it is important to balance calorie counting with a healthy lifestyle that includes regular activity. Aim for 150 minutes of moderate exercise (such as walking) or 75  minutes of vigorous exercise (such as running) each week.  Where do I find calorie information?    The number of calories in a food can be found on a Nutrition Facts label. If a food does not have a Nutrition Facts label, try to look up the calories online or ask your dietitian for help.  Remember that calories are listed per serving. If you choose to have more than one serving of a food, you will have to multiply the calories per serving by the amount of servings you plan to eat. For example, the label on a package of bread might say that a serving size is 1 slice and that there are 90 calories in a serving. If you eat 1 slice, you will have eaten 90 calories. If you eat 2 slices, you will have eaten 180 calories.  How do I keep a food log?  Immediately after each meal, record the following information in your food log:  · What you ate. Don't forget to include toppings, sauces, and other extras on the food.  · How much you ate. This can be measured in cups, ounces, or number of items.  · How many calories each food and drink had.  · The total number of calories in the meal.    Keep your food log near you, such as in a small notebook in your pocket, or use a mobile jefferson or website. Some programs will calculate calories for you and show you how many calories you have left for the day to meet your goal.  What are some calorie counting tips?  · Use your calories on foods and drinks that will fill you up and not leave you hungry:  ? Some examples of foods that fill you up are nuts and nut butters, vegetables, lean proteins, and high-fiber foods like whole grains. High-fiber foods are foods with more than 5 g fiber per serving.  ? Drinks such as sodas, specialty coffee drinks, alcohol, and juices have a lot of calories, yet do not fill you up.  · Eat nutritious foods and avoid empty calories. Empty calories are calories you get from foods or beverages that do not have many vitamins or protein, such as candy, sweets, and  "soda. It is better to have a nutritious high-calorie food (such as an avocado) than a food with few nutrients (such as a bag of chips).  · Know how many calories are in the foods you eat most often. This will help you calculate calorie counts faster.  · Pay attention to calories in drinks. Low-calorie drinks include water and unsweetened drinks.  · Pay attention to nutrition labels for \"low fat\" or \"fat free\" foods. These foods sometimes have the same amount of calories or more calories than the full fat versions. They also often have added sugar, starch, or salt, to make up for flavor that was removed with the fat.  · Find a way of tracking calories that works for you. Get creative. Try different apps or programs if writing down calories does not work for you.  What are some portion control tips?  · Know how many calories are in a serving. This will help you know how many servings of a certain food you can have.  · Use a measuring cup to measure serving sizes. You could also try weighing out portions on a kitchen scale. With time, you will be able to estimate serving sizes for some foods.  · Take some time to put servings of different foods on your favorite plates, bowls, and cups so you know what a serving looks like.  · Try not to eat straight from a bag or box. Doing this can lead to overeating. Put the amount you would like to eat in a cup or on a plate to make sure you are eating the right portion.  · Use smaller plates, glasses, and bowls to prevent overeating.  · Try not to multitask (for example, watch TV or use your computer) while eating. If it is time to eat, sit down at a table and enjoy your food. This will help you to know when you are full. It will also help you to be aware of what you are eating and how much you are eating.  What are tips for following this plan?  Reading food labels  · Check the calorie count compared to the serving size. The serving size may be smaller than what you are used to " eating.  · Check the source of the calories. Make sure the food you are eating is high in vitamins and protein and low in saturated and trans fats.  Shopping  · Read nutrition labels while you shop. This will help you make healthy decisions before you decide to purchase your food.  · Make a grocery list and stick to it.  Cooking  · Try to cook your favorite foods in a healthier way. For example, try baking instead of frying.  · Use low-fat dairy products.  Meal planning  · Use more fruits and vegetables. Half of your plate should be fruits and vegetables.  · Include lean proteins like poultry and fish.  How do I count calories when eating out?  · Ask for smaller portion sizes.  · Consider sharing an entree and sides instead of getting your own entree.  · If you get your own entree, eat only half. Ask for a box at the beginning of your meal and put the rest of your entree in it so you are not tempted to eat it.  · If calories are listed on the menu, choose the lower calorie options.  · Choose dishes that include vegetables, fruits, whole grains, low-fat dairy products, and lean protein.  · Choose items that are boiled, broiled, grilled, or steamed. Stay away from items that are buttered, battered, fried, or served with cream sauce. Items labeled “crispy” are usually fried, unless stated otherwise.  · Choose water, low-fat milk, unsweetened iced tea, or other drinks without added sugar. If you want an alcoholic beverage, choose a lower calorie option such as a glass of wine or light beer.  · Ask for dressings, sauces, and syrups on the side. These are usually high in calories, so you should limit the amount you eat.  · If you want a salad, choose a garden salad and ask for grilled meats. Avoid extra toppings like palomo, cheese, or fried items. Ask for the dressing on the side, or ask for olive oil and vinegar or lemon to use as dressing.  · Estimate how many servings of a food you are given. For example, a serving of  "cooked rice is ½ cup or about the size of half a baseball. Knowing serving sizes will help you be aware of how much food you are eating at restaurants. The list below tells you how big or small some common portion sizes are based on everyday objects:  ? 1 oz--4 stacked dice.  ? 3 oz--1 deck of cards.  ? 1 tsp--1 die.  ? 1 Tbsp--½ a ping-pong ball.  ? 2 Tbsp--1 ping-pong ball.  ? ½ cup--½ baseball.  ? 1 cup--1 baseball.  Summary  · Calorie counting means keeping track of how many calories you eat and drink each day. If you eat fewer calories than your body needs, you should lose weight.  · A healthy amount of weight to lose per week is usually 1-2 lb (0.5-0.9 kg). This usually means reducing your daily calorie intake by 500-750 calories.  · The number of calories in a food can be found on a Nutrition Facts label. If a food does not have a Nutrition Facts label, try to look up the calories online or ask your dietitian for help.  · Use your calories on foods and drinks that will fill you up, and not on foods and drinks that will leave you hungry.  · Use smaller plates, glasses, and bowls to prevent overeating.  This information is not intended to replace advice given to you by your health care provider. Make sure you discuss any questions you have with your health care provider.  Document Released: 12/18/2006 Document Revised: 11/17/2017 Document Reviewed: 11/17/2017  [employer] Patient Education © 2019 [employer]  DASH Eating Plan  DASH stands for \"Dietary Approaches to Stop Hypertension.\" The DASH eating plan is a healthy eating plan that has been shown to reduce high blood pressure (hypertension). It may also reduce your risk for type 2 diabetes, heart disease, and stroke. The DASH eating plan may also help with weight loss.  What are tips for following this plan?  General guidelines  · Avoid eating more than 2,300 mg (milligrams) of salt (sodium) a day. If you have hypertension, you may need to reduce " "your sodium intake to 1,500 mg a day.  · Limit alcohol intake to no more than 1 drink a day for nonpregnant women and 2 drinks a day for men. One drink equals 12 oz of beer, 5 oz of wine, or 1½ oz of hard liquor.  · Work with your health care provider to maintain a healthy body weight or to lose weight. Ask what an ideal weight is for you.  · Get at least 30 minutes of exercise that causes your heart to beat faster (aerobic exercise) most days of the week. Activities may include walking, swimming, or biking.  · Work with your health care provider or diet and nutrition specialist (dietitian) to adjust your eating plan to your individual calorie needs.  Reading food labels  · Check food labels for the amount of sodium per serving. Choose foods with less than 5 percent of the Daily Value of sodium. Generally, foods with less than 300 mg of sodium per serving fit into this eating plan.  · To find whole grains, look for the word \"whole\" as the first word in the ingredient list.  Shopping  · Buy products labeled as \"low-sodium\" or \"no salt added.\"  · Buy fresh foods. Avoid canned foods and premade or frozen meals.  Cooking  · Avoid adding salt when cooking. Use salt-free seasonings or herbs instead of table salt or sea salt. Check with your health care provider or pharmacist before using salt substitutes.  · Do not evans foods. Cook foods using healthy methods such as baking, boiling, grilling, and broiling instead.  · Cook with heart-healthy oils, such as olive, canola, soybean, or sunflower oil.  Meal planning    · Eat a balanced diet that includes:  ? 5 or more servings of fruits and vegetables each day. At each meal, try to fill half of your plate with fruits and vegetables.  ? Up to 6-8 servings of whole grains each day.  ? Less than 6 oz of lean meat, poultry, or fish each day. A 3-oz serving of meat is about the same size as a deck of cards. One egg equals 1 oz.  ? 2 servings of low-fat dairy each day.  ? A serving " of nuts, seeds, or beans 5 times each week.  ? Heart-healthy fats. Healthy fats called Omega-3 fatty acids are found in foods such as flaxseeds and coldwater fish, like sardines, salmon, and mackerel.  · Limit how much you eat of the following:  ? Canned or prepackaged foods.  ? Food that is high in trans fat, such as fried foods.  ? Food that is high in saturated fat, such as fatty meat.  ? Sweets, desserts, sugary drinks, and other foods with added sugar.  ? Full-fat dairy products.  · Do not salt foods before eating.  · Try to eat at least 2 vegetarian meals each week.  · Eat more home-cooked food and less restaurant, buffet, and fast food.  · When eating at a restaurant, ask that your food be prepared with less salt or no salt, if possible.  What foods are recommended?  The items listed may not be a complete list. Talk with your dietitian about what dietary choices are best for you.  Grains  Whole-grain or whole-wheat bread. Whole-grain or whole-wheat pasta. Brown rice. Oatmeal. Quinoa. Bulgur. Whole-grain and low-sodium cereals. Dior bread. Low-fat, low-sodium crackers. Whole-wheat flour tortillas.  Vegetables  Fresh or frozen vegetables (raw, steamed, roasted, or grilled). Low-sodium or reduced-sodium tomato and vegetable juice. Low-sodium or reduced-sodium tomato sauce and tomato paste. Low-sodium or reduced-sodium canned vegetables.  Fruits  All fresh, dried, or frozen fruit. Canned fruit in natural juice (without added sugar).  Meat and other protein foods  Skinless chicken or turkey. Ground chicken or turkey. Pork with fat trimmed off. Fish and seafood. Egg whites. Dried beans, peas, or lentils. Unsalted nuts, nut butters, and seeds. Unsalted canned beans. Lean cuts of beef with fat trimmed off. Low-sodium, lean deli meat.  Dairy  Low-fat (1%) or fat-free (skim) milk. Fat-free, low-fat, or reduced-fat cheeses. Nonfat, low-sodium ricotta or cottage cheese. Low-fat or nonfat yogurt. Low-fat, low-sodium  cheese.  Fats and oils  Soft margarine without trans fats. Vegetable oil. Low-fat, reduced-fat, or light mayonnaise and salad dressings (reduced-sodium). Canola, safflower, olive, soybean, and sunflower oils. Avocado.  Seasoning and other foods  Herbs. Spices. Seasoning mixes without salt. Unsalted popcorn and pretzels. Fat-free sweets.  What foods are not recommended?  The items listed may not be a complete list. Talk with your dietitian about what dietary choices are best for you.  Grains  Baked goods made with fat, such as croissants, muffins, or some breads. Dry pasta or rice meal packs.  Vegetables  Creamed or fried vegetables. Vegetables in a cheese sauce. Regular canned vegetables (not low-sodium or reduced-sodium). Regular canned tomato sauce and paste (not low-sodium or reduced-sodium). Regular tomato and vegetable juice (not low-sodium or reduced-sodium). Pickles. Olives.  Fruits  Canned fruit in a light or heavy syrup. Fried fruit. Fruit in cream or butter sauce.  Meat and other protein foods  Fatty cuts of meat. Ribs. Fried meat. Casas. Sausage. Bologna and other processed lunch meats. Salami. Fatback. Hotdogs. Bratwurst. Salted nuts and seeds. Canned beans with added salt. Canned or smoked fish. Whole eggs or egg yolks. Chicken or turkey with skin.  Dairy  Whole or 2% milk, cream, and half-and-half. Whole or full-fat cream cheese. Whole-fat or sweetened yogurt. Full-fat cheese. Nondairy creamers. Whipped toppings. Processed cheese and cheese spreads.  Fats and oils  Butter. Stick margarine. Lard. Shortening. Ghee. Casas fat. Tropical oils, such as coconut, palm kernel, or palm oil.  Seasoning and other foods  Salted popcorn and pretzels. Onion salt, garlic salt, seasoned salt, table salt, and sea salt. Worcestershire sauce. Tartar sauce. Barbecue sauce. Teriyaki sauce. Soy sauce, including reduced-sodium. Steak sauce. Canned and packaged gravies. Fish sauce. Oyster sauce. Cocktail sauce. Horseradish  that you find on the shelf. Ketchup. Mustard. Meat flavorings and tenderizers. Bouillon cubes. Hot sauce and Tabasco sauce. Premade or packaged marinades. Premade or packaged taco seasonings. Relishes. Regular salad dressings.  Where to find more information:  · National Heart, [employer], and [employer] [place]: www.nhlbi.nih.gov  · American Heart Association: www.heart.org  Summary  · The DASH eating plan is a healthy eating plan that has been shown to reduce high blood pressure (hypertension). It may also reduce your risk for type 2 diabetes, heart disease, and stroke.  · With the DASH eating plan, you should limit salt (sodium) intake to 2,300 mg a day. If you have hypertension, you may need to reduce your sodium intake to 1,500 mg a day.  · When on the DASH eating plan, aim to eat more fresh fruits and vegetables, whole grains, lean proteins, low-fat dairy, and heart-healthy fats.  · Work with your health care provider or diet and nutrition specialist (dietitian) to adjust your eating plan to your individual calorie needs.  This information is not intended to replace advice given to you by your health care provider. Make sure you discuss any questions you have with your health care provider.  Document Released: 12/06/2012 Document Revised: 12/11/2017 Document Reviewed: 12/11/2017  [employer] Patient Education © 2019 [employer]

## 2019-09-19 ENCOUNTER — OFFICE VISIT (OUTPATIENT)
Dept: CARDIOLOGY | Facility: CLINIC | Age: 79
End: 2019-09-19

## 2019-09-19 VITALS
WEIGHT: 209.6 LBS | HEIGHT: 64 IN | RESPIRATION RATE: 14 BRPM | BODY MASS INDEX: 35.78 KG/M2 | OXYGEN SATURATION: 98 % | DIASTOLIC BLOOD PRESSURE: 50 MMHG | SYSTOLIC BLOOD PRESSURE: 120 MMHG | HEART RATE: 64 BPM

## 2019-09-19 DIAGNOSIS — I25.6 SILENT MYOCARDIAL ISCHEMIA: ICD-10-CM

## 2019-09-19 DIAGNOSIS — I20.8 ANGINAL EQUIVALENT (HCC): ICD-10-CM

## 2019-09-19 DIAGNOSIS — R06.02 SHORTNESS OF BREATH: ICD-10-CM

## 2019-09-19 DIAGNOSIS — E11.9 TYPE 2 DIABETES MELLITUS WITHOUT COMPLICATION, WITHOUT LONG-TERM CURRENT USE OF INSULIN (HCC): ICD-10-CM

## 2019-09-19 DIAGNOSIS — E66.9 OBESITY (BMI 30-39.9): ICD-10-CM

## 2019-09-19 DIAGNOSIS — R94.39 ABNORMAL STRESS TEST: ICD-10-CM

## 2019-09-19 DIAGNOSIS — E78.00 HYPERCHOLESTEREMIA: ICD-10-CM

## 2019-09-19 DIAGNOSIS — I27.20 PHT (PULMONARY HYPERTENSION) (HCC): Primary | ICD-10-CM

## 2019-09-19 DIAGNOSIS — I10 ESSENTIAL HYPERTENSION: ICD-10-CM

## 2019-09-19 PROCEDURE — 99214 OFFICE O/P EST MOD 30 MIN: CPT | Performed by: NURSE PRACTITIONER

## 2019-09-19 RX ORDER — POTASSIUM CHLORIDE 750 MG/1
TABLET, FILM COATED, EXTENDED RELEASE ORAL
COMMUNITY
Start: 2019-08-30 | End: 2020-06-01 | Stop reason: SDUPTHER

## 2019-09-19 RX ORDER — OXYBUTYNIN CHLORIDE 5 MG/1
5 TABLET ORAL 2 TIMES DAILY
COMMUNITY
Start: 2019-09-07

## 2019-09-19 NOTE — PROGRESS NOTES
Chief Complaint   Patient presents with   • Follow-up     cardiac management   • Hypertension   • pt takes 81 mg daily of aspirin       Cardiac Complaints  dyspnea      Subjective   Alena Rojo is a 78 y.o. female with hypertension, hyperlipidemia, diabetes, and metabolic syndrome. Cardiac work up in 2010 showed normal coronaries, normal renals with elevated PA pressure. She developed more shortness of breath and fatigue and cardiac work up repeated in 2016 showed possible anterior ischemia. Imdur was added but unable to tolerate secondary to body aches. She has continued to take lasix for bilateral lower extremity edema.      Patient returns today for follow up and denies any new concerns. She does report shortness of breath with exertion and admits with incline and steps the shortness of breath is more noticeable, but no worse than prior. She denies chest pain, dizziness, palpitations, syncope/pre-syncope.  Labs she reports are followed by PCP and she thinks more are to be done soon.  Patient reports blood glucose has been stable.  Bleeding and bruising denied on ASA therapy.  No refills needed.         Cardiac History  Past Surgical History:   Procedure Laterality Date   • CARDIAC CATHETERIZATION  06/18/2010    Normal coronaries, renals. PA-50mmHg   • CARDIOVASCULAR STRESS TEST  06/02/2010    L. Myoview- (Dr. John) Anterior & Lateral Ischemia   • CARDIOVASCULAR STRESS TEST  03/14/2016    EF 65%, possible anterior ischemia, imdur added   • ECHO - CONVERTED  06/02/2010    (Dr. Durant) EF 55%. RVSP-70 mmHg   • ECHO - CONVERTED  03/25/2013    EF 65% RVSP 38 mmHg   • OTHER SURGICAL HISTORY  07/29/2010    sleep study       Current Outpatient Medications   Medication Sig Dispense Refill   • acetaminophen (TYLENOL) 325 MG tablet Take 650 mg by mouth as needed for mild pain (1-3).     • aspirin 81 MG EC tablet Take 81 mg by mouth Daily.     • furosemide (LASIX) 40 MG tablet Take 40 mg by mouth daily.     • lisinopril  (PRINIVIL,ZESTRIL) 20 MG tablet Take 1 tablet by mouth Daily. 30 tablet 11   • Loperamide HCl (IMODIUM A-D PO) Take  by mouth As Needed.     • NIFEdipine XL (PROCARDIA XL) 60 MG 24 hr tablet Take 60 mg by mouth daily.     • oxybutynin (DITROPAN) 5 MG tablet      • potassium chloride (K-DUR) 10 MEQ CR tablet      • potassium chloride (K-DUR,KLOR-CON) 10 MEQ CR tablet Take 10 mEq by mouth daily.     • pravastatin (PRAVACHOL) 40 MG tablet Take 40 mg by mouth daily.     • SITagliptin (JANUVIA) 100 MG tablet Take 100 mg by mouth Daily.       No current facility-administered medications for this visit.        Other    Past Medical History:   Diagnosis Date   • Arthritis    • Celiac disease/sprue    • H/O colonoscopy     2004- 2005 -- no polyps   • H/O total knee replacement     bilateral   • H/O: hysterectomy    • Hx of cholecystectomy 06/01/2016   • Hypercholesterolemia    • Hyperlipidemia    • Hypertension    • Sleep disorder 07/29/2010    Sleep study       Social History     Socioeconomic History   • Marital status:      Spouse name: Not on file   • Number of children: Not on file   • Years of education: Not on file   • Highest education level: Not on file   Tobacco Use   • Smoking status: Never Smoker   • Smokeless tobacco: Never Used   Substance and Sexual Activity   • Alcohol use: No   • Drug use: No       Family History   Problem Relation Age of Onset   • Hypertension Mother    • Cancer Mother    • Heart disease Mother    • Heart attack Father    • Heart disease Father    • Cirrhosis Father    • Hypertension Son    • Hypertension Son        Review of Systems   Constitution: Negative for weakness and malaise/fatigue.   Cardiovascular: Positive for dyspnea on exertion. Negative for chest pain, claudication, irregular heartbeat, near-syncope, orthopnea, palpitations and syncope.   Respiratory: Positive for shortness of breath. Negative for cough and wheezing.    Musculoskeletal: Positive for stiffness. Negative  "for back pain.   Gastrointestinal: Negative for anorexia, heartburn, nausea and vomiting.   Genitourinary: Negative for dysuria, hematuria, hesitancy and nocturia.   Neurological: Negative for dizziness, light-headedness and loss of balance.   Psychiatric/Behavioral: Negative for depression and memory loss. The patient is not nervous/anxious.            Objective     /50 (BP Location: Left arm, Patient Position: Sitting)   Pulse 64   Resp 14   Ht 162.6 cm (64\")   Wt 95.1 kg (209 lb 9.6 oz)   SpO2 98%   BMI 35.98 kg/m²     Physical Exam   Constitutional: She is oriented to person, place, and time. She appears well-developed and well-nourished.   HENT:   Head: Normocephalic and atraumatic.   Eyes: EOM are normal. Pupils are equal, round, and reactive to light.   Neck: Normal range of motion. Neck supple.   Cardiovascular: Normal rate and regular rhythm.   Murmur heard.  Pulmonary/Chest: Effort normal and breath sounds normal.   Abdominal: Soft.   Musculoskeletal: Normal range of motion.   Neurological: She is alert and oriented to person, place, and time.   Skin: Skin is warm.   Psychiatric: She has a normal mood and affect. Her behavior is normal.       Procedures    Assessment/Plan     HR is stable today with regular rhythm noted. HTN well managed on current, same regimen continued. Patient reports with lasix BLE edema has been well managed, same advised as well as limited sodium intake. Repeat cardiac workup will be recommended today as she continues to report shortness of breath, risk of silent ischemic burden, and length of time since last testing to rule out any ischemia, LV dysfunction, and structural concerns. More recommendations to follow. Labs she admits are followed by your office including AIC for DM management and FLP for hyperlipidemia.  She has continued on pravachol therapy and tolerates well, side effects denied. Same regimen continued.  Could we have most recent labs for review? No " refills needed as she reports with your office. BMI noted at 35.98, good cardiac ADA diet with limited carbs, calories, and activity as tolerated advised. 6 month follow up recommended or sooner if needed.       Problems Addressed this Visit        Cardiovascular and Mediastinum    Abnormal stress test    Relevant Orders    Stress Test With Myocardial Perfusion One Day    Adult Transthoracic Echo Complete W/ Cont if Necessary Per Protocol    Hypercholesteremia    Essential hypertension    PHT (pulmonary hypertension) (CMS/[facility]) - Primary    Relevant Orders    Stress Test With Myocardial Perfusion One Day    Adult Transthoracic Echo Complete W/ Cont if Necessary Per Protocol       Endocrine    Type 2 diabetes mellitus without complication, without long-term current use of insulin (CMS/[facility])      Other Visit Diagnoses     Shortness of breath        Relevant Orders    Stress Test With Myocardial Perfusion One Day    Adult Transthoracic Echo Complete W/ Cont if Necessary Per Protocol    Silent myocardial ischemia        Relevant Orders    Stress Test With Myocardial Perfusion One Day    Adult Transthoracic Echo Complete W/ Cont if Necessary Per Protocol    Anginal equivalent (CMS/[facility])        Obesity (BMI 30-39.9)              Patient's Body mass index is 35.98 kg/m². BMI is above normal parameters. Recommendations include: nutrition counseling.              Electronically signed by PIETRO Guerrero September 19, 2019 4:11 PM

## 2019-09-19 NOTE — PROGRESS NOTES
Howard Varghese Jr., MD  Alena Rojo  1940 09/19/2019    Patient Active Problem List   Diagnosis   • Abnormal stress test   • Type 2 diabetes mellitus without complication, without long-term current use of insulin (CMS/HCC)   • Hypercholesteremia   • Essential hypertension   • Metabolic syndrome   • Murmur, cardiac   • Celiac disease   • PHT (pulmonary hypertension) (CMS/[facility])       Dear Howard Varghese Jr., MD:    Negin     Alena Rojo is a 78 y.o. female with the problems as listed above, presents    Chief Complaint   Patient presents with   • Follow-up     cardiac management   • Hypertension   • pt takes 81 mg daily of aspirin       History of Present Illness:        Allergies   Allergen Reactions   • Other Diarrhea     Gluten   :      Current Outpatient Medications:   •  acetaminophen (TYLENOL) 325 MG tablet, Take 650 mg by mouth as needed for mild pain (1-3)., Disp: , Rfl:   •  aspirin 81 MG EC tablet, Take 81 mg by mouth Daily., Disp: , Rfl:   •  furosemide (LASIX) 40 MG tablet, Take 40 mg by mouth daily., Disp: , Rfl:   •  lisinopril (PRINIVIL,ZESTRIL) 20 MG tablet, Take 1 tablet by mouth Daily., Disp: 30 tablet, Rfl: 11  •  Loperamide HCl (IMODIUM A-D PO), Take  by mouth As Needed., Disp: , Rfl:   •  NIFEdipine XL (PROCARDIA XL) 60 MG 24 hr tablet, Take 60 mg by mouth daily., Disp: , Rfl:   •  oxybutynin (DITROPAN) 5 MG tablet, , Disp: , Rfl:   •  potassium chloride (K-DUR) 10 MEQ CR tablet, , Disp: , Rfl:   •  potassium chloride (K-DUR,KLOR-CON) 10 MEQ CR tablet, Take 10 mEq by mouth daily., Disp: , Rfl:   •  pravastatin (PRAVACHOL) 40 MG tablet, Take 40 mg by mouth daily., Disp: , Rfl:   •  SITagliptin (JANUVIA) 100 MG tablet, Take 100 mg by mouth Daily., Disp: , Rfl:       The following portions of the patient's history were reviewed and updated as appropriate: allergies, current medications, past family history, past medical history, past social history, past surgical history and  "problem list.    Social History     Tobacco Use   • Smoking status: Never Smoker   • Smokeless tobacco: Never Used   Substance Use Topics   • Alcohol use: No   • Drug use: No       ROS    Objective   Vitals:    09/19/19 1328   BP: 120/50   BP Location: Left arm   Patient Position: Sitting   Pulse: 64   Resp: 14   SpO2: 98%   Weight: 95.1 kg (209 lb 9.6 oz)   Height: 162.6 cm (64\")     Body mass index is 35.98 kg/m².        Physical Exam    No results found for: NA, K, CL, CO2, BUN, CREATININE, LABGLOM, GLUCOSE, CALCIUM, AST, ALT, ALKPHOS, LABIL2  No results found for: CKTOTAL  No results found for: WBC, HGB, HCT, PLT  No results found for: INR  No results found for: MG  No results found for: TSH, PSA, CHLPL, TRIG, HDL, LDL   No results found for: BNP    During this visit the following were done:  Labs Reviewed [x]    Labs Ordered []    Radiology Reports Reviewed [x]    Radiology Ordered []    PCP Records Reviewed []    Referring Provider Records Reviewed []    ER Records Reviewed [x]    Hospital Records Reviewed [x]    History Obtained From Family []    Radiology Images Reviewed [x]    Other Reviewed [x]    Records Requested []      Procedures      Assessment/Plan   No diagnosis found.             Recommendations:       No Follow-up on file.    As always, Howard Varghese Jr., MD  I appreciate very much the opportunity to participate in the cardiovascular care of your patients. Please do not hesitate to call me with any questions with regards to Alena Magallondanieltea evaluation and management.           With Best Regards,        Alli Dumont MD, [facility]    Please note that portions of this note were completed with a voice recognition program.          "

## 2019-09-25 ENCOUNTER — HOSPITAL ENCOUNTER (OUTPATIENT)
Dept: CARDIOLOGY | Facility: HOSPITAL | Age: 79
Discharge: HOME | End: 2019-09-25

## 2019-09-25 VITALS — WEIGHT: 209.66 LBS | HEIGHT: 64 IN | BODY MASS INDEX: 35.79 KG/M2

## 2019-09-25 DIAGNOSIS — I25.6 SILENT MYOCARDIAL ISCHEMIA: ICD-10-CM

## 2019-09-25 DIAGNOSIS — R06.02 SHORTNESS OF BREATH: ICD-10-CM

## 2019-09-25 DIAGNOSIS — I27.20 PHT (PULMONARY HYPERTENSION) (HCC): ICD-10-CM

## 2019-09-25 DIAGNOSIS — R94.39 ABNORMAL STRESS TEST: ICD-10-CM

## 2019-09-25 LAB
BH CV ECHO MEAS - ACS: 1.8 CM
BH CV ECHO MEAS - AO MAX PG (FULL): 3.8 MMHG
BH CV ECHO MEAS - AO MAX PG: 7.6 MMHG
BH CV ECHO MEAS - AO MEAN PG (FULL): 2.5 MMHG
BH CV ECHO MEAS - AO MEAN PG: 4.2 MMHG
BH CV ECHO MEAS - AO ROOT AREA (BSA CORRECTED): 1.5
BH CV ECHO MEAS - AO ROOT AREA: 7 CM^2
BH CV ECHO MEAS - AO ROOT DIAM: 3 CM
BH CV ECHO MEAS - AO V2 MAX: 137.5 CM/SEC
BH CV ECHO MEAS - AO V2 MEAN: 96.2 CM/SEC
BH CV ECHO MEAS - AO V2 VTI: 35.3 CM
BH CV ECHO MEAS - BSA(HAYCOCK): 2.1 M^2
BH CV ECHO MEAS - BSA: 2 M^2
BH CV ECHO MEAS - BZI_BMI: 35.9 KILOGRAMS/M^2
BH CV ECHO MEAS - BZI_METRIC_HEIGHT: 162.6 CM
BH CV ECHO MEAS - BZI_METRIC_WEIGHT: 94.8 KG
BH CV ECHO MEAS - CI(CUBED): 5.9 L/MIN/M^2
BH CV ECHO MEAS - CI(TEICH): 5.4 L/MIN/M^2
BH CV ECHO MEAS - CO(CUBED): 11.8 L/MIN
BH CV ECHO MEAS - CO(TEICH): 10.7 L/MIN
BH CV ECHO MEAS - EDV(CUBED): 92 ML
BH CV ECHO MEAS - EDV(TEICH): 93.1 ML
BH CV ECHO MEAS - EF(CUBED): 76.6 %
BH CV ECHO MEAS - EF(TEICH): 68.8 %
BH CV ECHO MEAS - ESV(CUBED): 21.5 ML
BH CV ECHO MEAS - ESV(TEICH): 29 ML
BH CV ECHO MEAS - FS: 38.4 %
BH CV ECHO MEAS - IVS/LVPW: 1
BH CV ECHO MEAS - IVSD: 1.2 CM
BH CV ECHO MEAS - LA DIMENSION(2D): 4.2 CM
BH CV ECHO MEAS - LA DIMENSION: 4.2 CM
BH CV ECHO MEAS - LA/AO: 1.4
BH CV ECHO MEAS - LAT PEAK E' VEL: 7 CM/SEC
BH CV ECHO MEAS - LV IVRT: 0.11 SEC
BH CV ECHO MEAS - LV MASS(C)D: 199.1 GRAMS
BH CV ECHO MEAS - LV MASS(C)DI: 99.9 GRAMS/M^2
BH CV ECHO MEAS - LV MAX PG: 3.7 MMHG
BH CV ECHO MEAS - LV MEAN PG: 1.8 MMHG
BH CV ECHO MEAS - LV V1 MAX: 96.7 CM/SEC
BH CV ECHO MEAS - LV V1 MEAN: 59.9 CM/SEC
BH CV ECHO MEAS - LV V1 VTI: 21.4 CM
BH CV ECHO MEAS - LVIDD: 4.5 CM
BH CV ECHO MEAS - LVIDS: 2.8 CM
BH CV ECHO MEAS - LVPWD: 1.2 CM
BH CV ECHO MEAS - MED PEAK E' VEL: 9 CM/SEC
BH CV ECHO MEAS - MITRAL HR: 127.5 BPM
BH CV ECHO MEAS - MITRAL R-R: 0.47 SEC
BH CV ECHO MEAS - MM HR: 166.7 BPM
BH CV ECHO MEAS - MM R-R INT: 0.36 SEC
BH CV ECHO MEAS - MV A MAX VEL: 53.5 CM/SEC
BH CV ECHO MEAS - MV DEC SLOPE: 346.9 CM/SEC^2
BH CV ECHO MEAS - MV DEC TIME: 0.24 SEC
BH CV ECHO MEAS - MV E MAX VEL: 84.1 CM/SEC
BH CV ECHO MEAS - MV E/A: 1.6
BH CV ECHO MEAS - MV MAX PG: 3.8 MMHG
BH CV ECHO MEAS - MV MEAN PG: 1.4 MMHG
BH CV ECHO MEAS - MV V2 MAX: 97.3 CM/SEC
BH CV ECHO MEAS - MV V2 MEAN: 55 CM/SEC
BH CV ECHO MEAS - MV V2 VTI: 25.7 CM
BH CV ECHO MEAS - PA MAX PG (FULL): 6.4 MMHG
BH CV ECHO MEAS - PA MAX PG: 9 MMHG
BH CV ECHO MEAS - PA MEAN PG (FULL): 3 MMHG
BH CV ECHO MEAS - PA MEAN PG: 4.6 MMHG
BH CV ECHO MEAS - PA V2 MAX: 150.2 CM/SEC
BH CV ECHO MEAS - PA V2 MEAN: 99.7 CM/SEC
BH CV ECHO MEAS - PA V2 VTI: 31 CM
BH CV ECHO MEAS - PULM. HR: 192.7 BPM
BH CV ECHO MEAS - PULM. R-R: 0.31 SEC
BH CV ECHO MEAS - RAP SYSTOLE: 10 MMHG
BH CV ECHO MEAS - RV MAX PG: 2.7 MMHG
BH CV ECHO MEAS - RV MEAN PG: 1.5 MMHG
BH CV ECHO MEAS - RV V1 MAX: 81.4 CM/SEC
BH CV ECHO MEAS - RV V1 MEAN: 56.6 CM/SEC
BH CV ECHO MEAS - RV V1 VTI: 17 CM
BH CV ECHO MEAS - RVDD: 3 CM
BH CV ECHO MEAS - RVSP: 42 MMHG
BH CV ECHO MEAS - SI(AO): 124.7 ML/M^2
BH CV ECHO MEAS - SI(CUBED): 35.4 ML/M^2
BH CV ECHO MEAS - SI(TEICH): 32.2 ML/M^2
BH CV ECHO MEAS - SV(AO): 248.6 ML
BH CV ECHO MEAS - SV(CUBED): 70.5 ML
BH CV ECHO MEAS - SV(TEICH): 64.1 ML
BH CV ECHO MEAS - TR MAX VEL: 269.1 CM/SEC
BH CV ECHO MEASUREMENTS AVERAGE E/E' RATIO: 10.51
BH CV STRESS COMMENTS STAGE 1: NORMAL
BH CV STRESS DOSE REGADENOSON STAGE 1: 0.4
BH CV STRESS DURATION MIN STAGE 1: 0
BH CV STRESS DURATION SEC STAGE 1: 10
BH CV STRESS PROTOCOL 1: NORMAL
BH CV STRESS RECOVERY BP: NORMAL MMHG
BH CV STRESS RECOVERY HR: 62 BPM
BH CV STRESS STAGE 1: 1
LV EF NUC BP: 80 %
MAXIMAL PREDICTED HEART RATE: 141 BPM
MAXIMAL PREDICTED HEART RATE: 141 BPM
PERCENT MAX PREDICTED HR: 46.81 %
STRESS BASELINE BP: NORMAL MMHG
STRESS BASELINE HR: 58 BPM
STRESS PERCENT HR: 55 %
STRESS POST PEAK BP: NORMAL MMHG
STRESS POST PEAK HR: 66 BPM
STRESS TARGET HR: 120 BPM
STRESS TARGET HR: 120 BPM

## 2019-09-25 PROCEDURE — 78452 HT MUSCLE IMAGE SPECT MULT: CPT

## 2019-09-25 PROCEDURE — 93017 CV STRESS TEST TRACING ONLY: CPT

## 2019-09-25 PROCEDURE — 93306 TTE W/DOPPLER COMPLETE: CPT | Performed by: INTERNAL MEDICINE

## 2019-09-25 PROCEDURE — 25010000002 REGADENOSON 0.4 MG/5ML SOLUTION: Performed by: INTERNAL MEDICINE

## 2019-09-25 PROCEDURE — 0 TECHNETIUM SESTAMIBI: Performed by: INTERNAL MEDICINE

## 2019-09-25 PROCEDURE — A9500 TC99M SESTAMIBI: HCPCS | Performed by: INTERNAL MEDICINE

## 2019-09-25 PROCEDURE — 78452 HT MUSCLE IMAGE SPECT MULT: CPT | Performed by: INTERNAL MEDICINE

## 2019-09-25 PROCEDURE — 93018 CV STRESS TEST I&R ONLY: CPT | Performed by: INTERNAL MEDICINE

## 2019-09-25 PROCEDURE — 93306 TTE W/DOPPLER COMPLETE: CPT

## 2019-09-25 RX ADMIN — REGADENOSON 0.4 MG: 0.08 INJECTION, SOLUTION INTRAVENOUS at 10:15

## 2019-09-25 RX ADMIN — TECHNETIUM TC 99M SESTAMIBI 1 DOSE: 1 INJECTION INTRAVENOUS at 10:15

## 2019-09-25 RX ADMIN — TECHNETIUM TC 99M SESTAMIBI 1 DOSE: 1 INJECTION INTRAVENOUS at 08:04

## 2020-04-01 RX ORDER — LISINOPRIL 20 MG/1
TABLET ORAL
Qty: 30 TABLET | Refills: 10 | Status: SHIPPED | OUTPATIENT
Start: 2020-04-01 | End: 2021-04-12

## 2020-06-01 ENCOUNTER — OFFICE VISIT (OUTPATIENT)
Dept: CARDIOLOGY | Facility: CLINIC | Age: 80
End: 2020-06-01

## 2020-06-01 VITALS
DIASTOLIC BLOOD PRESSURE: 72 MMHG | HEIGHT: 64 IN | HEART RATE: 70 BPM | WEIGHT: 210.4 LBS | BODY MASS INDEX: 35.92 KG/M2 | TEMPERATURE: 97.8 F | SYSTOLIC BLOOD PRESSURE: 130 MMHG

## 2020-06-01 DIAGNOSIS — E66.01 MORBIDLY OBESE (HCC): ICD-10-CM

## 2020-06-01 DIAGNOSIS — I27.20 PHT (PULMONARY HYPERTENSION) (HCC): ICD-10-CM

## 2020-06-01 DIAGNOSIS — E78.00 HYPERCHOLESTEREMIA: ICD-10-CM

## 2020-06-01 DIAGNOSIS — I10 ESSENTIAL HYPERTENSION: ICD-10-CM

## 2020-06-01 DIAGNOSIS — E11.9 TYPE 2 DIABETES MELLITUS WITHOUT COMPLICATION, WITHOUT LONG-TERM CURRENT USE OF INSULIN (HCC): ICD-10-CM

## 2020-06-01 PROCEDURE — 99213 OFFICE O/P EST LOW 20 MIN: CPT | Performed by: NURSE PRACTITIONER

## 2020-06-01 NOTE — PROGRESS NOTES
"Chief Complaint   Patient presents with   • Follow-up     Cardiac management. Has no cardiac complaints today, Does add that she gets tired very easily . Had labs last week per PCP.  Will Consult with Dr. Noe next week. PCP has discontinued use of Lasix and Potassium. d/t Kidney function . Patient has started having a cough and attributes it to Lisinopril.   • Med Refill     No refills needed today. Had medication list        Subjective       Alena Rojo is a 79 y.o. female with hypertension, hyperlipidemia, diabetes, and metabolic syndrome. Cardiac work up in 2010 showed normal coronaries, normal renals with elevated PA pressure. She developed more shortness of breath and fatigue and cardiac work up repeated in 2016 showed possible anterior ischemia. Imdur was added but unable to tolerate secondary to body aches. She has continued to take lasix for bilateral lower extremity edema.      At last office visit she reported more shortness of breath.  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  She was advised to continue medical management.  Echocardiogram showed LVEF of 65-70%.  No aortic stenosis noted.  Mild MR and mild to moderate TR with RVSP of 42 mmHg noted.    Today she comes to the office for a follow-up visit.  She is no longer on diuretic and potassium supplement due to decrease in kidney function.  Recent labs shows GFR 34 and creatinine 1.6.  She denies issues with swelling.  During the day she will periodically elevate her feet.  No recent chest pain or palpitations noted.  With inclines or exertion she does experience shortness of breath.  She has had a dry cough since starting lisinopril but states overall \"it seems better\".    HPI     Cardiac History:    Past Surgical History:   Procedure Laterality Date   • CARDIAC CATHETERIZATION  06/18/2010    Normal coronaries, renals. PA-50mmHg   • CARDIOVASCULAR STRESS " TEST  06/02/2010    L. Myoview- (Dr. John) Anterior & Lateral Ischemia   • CARDIOVASCULAR STRESS TEST  03/14/2016    L. Myoview- EF 65%, possible anterior ischemia, imdur added   • CARDIOVASCULAR STRESS TEST  09/25/2019    L.Cardiolite- EF > 70%. Small anterior Infarct Vs Breast Attenuation.   • ECHO - CONVERTED  06/02/2010    (Dr. Durant) EF 55%. RVSP-70 mmHg   • ECHO - CONVERTED  03/25/2013    EF 65% RVSP 38 mmHg   • ECHO - CONVERTED  09/25/2019    EF 70%. LA- 4.1 Cm. Mild MR. RVSP- 42 mmHg.   • OTHER SURGICAL HISTORY  07/29/2010    sleep study       Current Outpatient Medications   Medication Sig Dispense Refill   • acetaminophen (TYLENOL) 325 MG tablet Take 650 mg by mouth as needed for mild pain (1-3).     • aspirin 81 MG EC tablet Take 81 mg by mouth Daily.     • lisinopril (PRINIVIL,ZESTRIL) 20 MG tablet TAKE ONE TABLET BY MOUTH DAILY 30 tablet 10   • Loperamide HCl (IMODIUM A-D PO) Take  by mouth As Needed.     • NIFEdipine XL (PROCARDIA XL) 60 MG 24 hr tablet Take 60 mg by mouth daily.     • oxybutynin (DITROPAN) 5 MG tablet      • pravastatin (PRAVACHOL) 40 MG tablet Take 40 mg by mouth daily.     • SITagliptin (JANUVIA) 100 MG tablet Take 100 mg by mouth Daily.       No current facility-administered medications for this visit.        Other    Past Medical History:   Diagnosis Date   • Arthritis    • Celiac disease/sprue    • H/O colonoscopy     2004- 2005 -- no polyps   • H/O total knee replacement     bilateral   • H/O: hysterectomy    • Hx of cholecystectomy 06/01/2016   • Hypercholesterolemia    • Hyperlipidemia    • Hypertension    • Sleep disorder 07/29/2010    Sleep study       Social History     Socioeconomic History   • Marital status:      Spouse name: Not on file   • Number of children: Not on file   • Years of education: Not on file   • Highest education level: Not on file   Tobacco Use   • Smoking status: Never Smoker   • Smokeless tobacco: Never Used   Substance and Sexual Activity    "  • Alcohol use: No   • Drug use: No       Family History   Problem Relation Age of Onset   • Hypertension Mother    • Cancer Mother    • Heart disease Mother    • Heart attack Father    • Heart disease Father    • Cirrhosis Father    • Hypertension Son    • Hypertension Son        Review of Systems   Constitution: Positive for malaise/fatigue. Negative for decreased appetite, diaphoresis and fever.   HENT: Negative for congestion, hoarse voice and nosebleeds.    Cardiovascular: Negative for chest pain, near-syncope and palpitations.   Respiratory: Positive for cough (dry) and shortness of breath (with inclines, exertion). Negative for sleep disturbances due to breathing.    Endocrine: Negative for polydipsia, polyphagia and polyuria.   Hematologic/Lymphatic: Negative for bleeding problem. Does not bruise/bleed easily.   Skin: Negative.    Musculoskeletal: Positive for muscle cramps (at night).   Gastrointestinal: Negative for abdominal pain, change in bowel habit, dysphagia, melena and nausea.        Stomach cramps at night sometimes.    Genitourinary: Positive for bladder incontinence. Negative for dysuria and hematuria.   Neurological: Negative for dizziness, headaches, loss of balance and weakness.   Psychiatric/Behavioral: Negative for memory loss. The patient is nervous/anxious (at times,  having memory issues causes her some anxiety). The patient does not have insomnia.    Allergic/Immunologic: Negative for hives and persistent infections.        Objective      Labs 5/26/20-     Labs 10-19 per PCP: Glucose 116, BUN 19, creatinine 1.5, sodium 141, potassium 4.4, chloride 104, CO2 25, calcium 10, total protein 8.6, albumin 4.2, AST 24, ALT 11, ALP 74, total bili 0.5, GFR 36, total cholesterol 189, triglycerides 184, HDL 41,  A1c 6.3, TSH 3.27    /72 (BP Location: Left arm)   Pulse 70   Temp 97.8 °F (36.6 °C)   Ht 162 cm (63.78\")   Wt 95.4 kg (210 lb 6.4 oz)   BMI 36.36 kg/m² "     Physical Exam   Constitutional: She is oriented to person, place, and time. She appears well-nourished.   HENT:   Head: Normocephalic.   Eyes: Pupils are equal, round, and reactive to light. Conjunctivae are normal.   Neck: Normal range of motion. Neck supple.   Cardiovascular: Normal rate, regular rhythm, S1 normal and S2 normal.   No murmur heard.  Pulmonary/Chest: Effort normal and breath sounds normal. She has no wheezes. She has no rales.   Abdominal: Soft. Bowel sounds are normal.   Musculoskeletal: Normal range of motion. She exhibits edema (trace ankles). She exhibits no tenderness.   Neurological: She is alert and oriented to person, place, and time.   Skin: Skin is warm and dry.   Psychiatric: She has a normal mood and affect. Her behavior is normal.        Procedures: none today         Assessment/Plan      Alena was seen today for follow-up and med refill.    Diagnoses and all orders for this visit:    Essential hypertension    Hypercholesteremia    PHT (pulmonary hypertension) (CMS/[facility])    Type 2 diabetes mellitus without complication, without long-term current use of insulin (CMS/[facility])    Morbidly obese (CMS/[facility])      Hypertension-blood pressure normal today.  Advised to continue same dose Procardia and lisinopril.  She plans to see nephrologist next week.  Further recommendations regarding medication management based on his recommendations.    Hypercholesterolemia- on statin therapy in form of Pravachol.  Current LDL at goal.  Triglycerides increased being 216.  Diet encouraged.    Pulmonary pretension-recent stress and echo was reviewed.  No ischemia was noted.  Small area of anterior infarct was noted.  LVEF was in normal range.  Tricuspid regurg noted and PA pressure was increased being 42 mmHg.  Patient denies worsening shortness of breath.  She denies symptoms of sleep apnea.  Continue same plan of care at this time.    Patient's Body mass index is 36.36 kg/m². BMI is above normal parameters.  Recommendations include: nutrition counseling.  Weight is same as at last office visit.  Diet for weight loss encouraged with goal BMI to be less than 30.    Diabetes-recent A1c is 6.3.  Triglycerides slightly increased.  She will follow with you for diabetic management.  Again diet encouraged.     Alena Rojo  reports that she has never smoked. She has never used smokeless tobacco.      From a cardiac standpoint Alena appears stable at this time.  Her recent cardiac work-up was reviewed.  No further testing advised at this time.  A 6-month follow-up visit scheduled.  Please call sooner for cardiac concerns.           Electronically signed by PIETRO Puga,  June 1, 2020 13:20

## 2020-12-01 ENCOUNTER — OFFICE VISIT (OUTPATIENT)
Dept: CARDIOLOGY | Facility: CLINIC | Age: 80
End: 2020-12-01

## 2020-12-01 VITALS
SYSTOLIC BLOOD PRESSURE: 130 MMHG | WEIGHT: 212 LBS | HEIGHT: 64 IN | HEART RATE: 60 BPM | BODY MASS INDEX: 36.19 KG/M2 | TEMPERATURE: 97.6 F | DIASTOLIC BLOOD PRESSURE: 70 MMHG

## 2020-12-01 DIAGNOSIS — E78.00 HYPERCHOLESTEREMIA: ICD-10-CM

## 2020-12-01 DIAGNOSIS — I10 ESSENTIAL HYPERTENSION: ICD-10-CM

## 2020-12-01 DIAGNOSIS — I27.20 PHT (PULMONARY HYPERTENSION) (HCC): Primary | ICD-10-CM

## 2020-12-01 DIAGNOSIS — E11.9 TYPE 2 DIABETES MELLITUS WITHOUT COMPLICATION, WITHOUT LONG-TERM CURRENT USE OF INSULIN (HCC): ICD-10-CM

## 2020-12-01 PROCEDURE — 99213 OFFICE O/P EST LOW 20 MIN: CPT | Performed by: NURSE PRACTITIONER

## 2020-12-01 NOTE — PROGRESS NOTES
Chief Complaint   Patient presents with   • Follow-up     Cardiac management.   • Lab     Last labs done yesterday per PCP. PCP writes refills on now on medications.   • Edema     Has swelling in bilateral lower extremites, has red rash on left lower leg, PCP to order cream.     Subjective       Alena Rojo is a 80 y.o. female with hypertension, hyperlipidemia, diabetes, and metabolic syndrome. Cardiac work up in 2010 showed normal coronaries, normal renals with elevated PA pressure. She developed more shortness of breath and fatigue and cardiac work up repeated in 2016 showed possible anterior ischemia. Imdur was added but unable to tolerate secondary to body aches. She has continued to take lasix for bilateral lower extremity edema.      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  She was advised to continue medical management.  Echocardiogram showed LVEF of 65-70%.  No aortic stenosis noted.  Mild MR and mild to moderate TR with RVSP of 42 mmHg noted.    She returns today for regular follow-up.  Denies cardiac symptoms.  No chest pain, shortness of breath, palpitations.  She does have chronic fatigue especially when she eats more gluten.  She denies symptoms of sleep apnea stating she sleeps well.  Blood pressure has been well controlled.  She has noted left lower anterior surface of leg is red, irritated and swollen.  She has seen PCP with cream prescribed.  She thinks she is allergic to her cat.  Labs done yesterday with PCP. On 5/26/2020 BUN/CR 24/1.6, GFR 34, A1c 6.3%, TSH 3.16, , HDL 42, LDL 83, Trigs 216.       Cardiac History:    Past Surgical History:   Procedure Laterality Date   • CARDIAC CATHETERIZATION  06/18/2010    Normal coronaries, renals. PA-50mmHg   • CARDIOVASCULAR STRESS TEST  06/02/2010    NANY Velasquez- (Dr. John) Anterior & Lateral Ischemia   • CARDIOVASCULAR STRESS TEST  03/14/2016    L. Myoview-  EF 65%, possible anterior ischemia, imdur added   • CARDIOVASCULAR STRESS TEST  09/25/2019    L.Cardiolite- EF > 70%. Small anterior Infarct Vs Breast Attenuation.   • ECHO - CONVERTED  06/02/2010    (Dr. Durant) EF 55%. RVSP-70 mmHg   • ECHO - CONVERTED  03/25/2013    EF 65% RVSP 38 mmHg   • ECHO - CONVERTED  09/25/2019    EF 70%. LA- 4.1 Cm. Mild MR. RVSP- 42 mmHg.   • OTHER SURGICAL HISTORY  07/29/2010    sleep study     Current Outpatient Medications   Medication Sig Dispense Refill   • acetaminophen (TYLENOL) 325 MG tablet Take 650 mg by mouth as needed for mild pain (1-3).     • aspirin 81 MG EC tablet Take 81 mg by mouth Daily.     • lisinopril (PRINIVIL,ZESTRIL) 20 MG tablet TAKE ONE TABLET BY MOUTH DAILY 30 tablet 10   • Loperamide HCl (IMODIUM A-D PO) Take  by mouth As Needed.     • NIFEdipine XL (PROCARDIA XL) 60 MG 24 hr tablet Take 60 mg by mouth daily.     • oxybutynin (DITROPAN) 5 MG tablet Take 5 mg by mouth 2 (Two) Times a Day.     • pravastatin (PRAVACHOL) 40 MG tablet Take 40 mg by mouth daily.     • SITagliptin (JANUVIA) 100 MG tablet Take 100 mg by mouth Daily.       No current facility-administered medications for this visit.      Other    Past Medical History:   Diagnosis Date   • Arthritis    • Celiac disease/sprue    • H/O colonoscopy     2004- 2005 -- no polyps   • H/O total knee replacement     bilateral   • H/O: hysterectomy    • Hx of cholecystectomy 06/01/2016   • Hypercholesterolemia    • Hyperlipidemia    • Hypertension    • Sleep disorder 07/29/2010    Sleep study     Social History     Socioeconomic History   • Marital status:      Spouse name: Not on file   • Number of children: Not on file   • Years of education: Not on file   • Highest education level: Not on file   Tobacco Use   • Smoking status: Never Smoker   • Smokeless tobacco: Never Used   Substance and Sexual Activity   • Alcohol use: No   • Drug use: No     Family History   Problem Relation Age of Onset   •  "Hypertension Mother    • Cancer Mother    • Heart disease Mother    • Heart attack Father    • Heart disease Father    • Cirrhosis Father    • Hypertension Son    • Hypertension Son      Review of Systems   Constitution: Negative for decreased appetite and malaise/fatigue.   HENT: Negative.    Eyes: Negative for blurred vision.   Cardiovascular: Negative for chest pain, dyspnea on exertion, leg swelling, palpitations and syncope.   Respiratory: Negative for shortness of breath and sleep disturbances due to breathing.    Endocrine: Negative.    Hematologic/Lymphatic: Negative for bleeding problem. Does not bruise/bleed easily.   Skin: Negative.    Musculoskeletal: Negative for falls and myalgias.   Gastrointestinal: Negative for abdominal pain, heartburn and melena.   Genitourinary: Negative for hematuria.   Neurological: Negative for dizziness and light-headedness.   Psychiatric/Behavioral: Negative for altered mental status.   Allergic/Immunologic: Negative.       Objective     /70 (BP Location: Left arm)   Pulse 60   Temp 97.6 °F (36.4 °C)   Ht 162 cm (63.78\")   Wt 96.2 kg (212 lb)   BMI 36.64 kg/m²     Vitals signs and nursing note reviewed.   Constitutional:       General: Not in acute distress.     Appearance: Well-developed. Not diaphoretic.   Eyes:      Pupils: Pupils are equal, round, and reactive to light.   HENT:      Head: Normocephalic.   Neck:      Musculoskeletal: Normal range of motion.   Pulmonary:      Effort: Pulmonary effort is normal. No respiratory distress.      Breath sounds: Normal breath sounds.   Cardiovascular:      Normal rate. Regular rhythm.   Pulses:     Intact distal pulses.   Abdominal:      General: Bowel sounds are normal.      Palpations: Abdomen is soft.   Musculoskeletal: Normal range of motion.   Skin:     General: Skin is warm and dry.   Neurological:      Mental Status: Alert and oriented to person, place, and time.        Procedures             1.  Pulmonary " hypertension-PA pressure improved to 42 mmHg.  She denies shortness of breath or dyspnea on exertion.  Continue calcium channel blocker and lisinopril.  She declined repeat sleep apnea work-up at this time.    2.  HTN-well-controlled at 130/70.  Continue current therapy.  Limit sodium.  Weight loss.    3.  Hyperlipidemia-LDL well controlled with pravastatin.  Triglycerides elevated at 216.  Encouraged to limit sugar and carbohydrate intake.    4.  Diabetes-managed with Januvia with A1c at goal at 6.3%.    No changes made today.  We reviewed her cardiac work-up in 2019 showing no ischemia and normal LV function.  She appears stable at this time.  Follow-up in 6 months.    Patient's Body mass index is 36.64 kg/m². BMI is above normal parameters. Recommendations include: nutrition counseling.               Electronically signed by PIETRO Tran,  December 1, 2020 14:25 EST

## 2021-04-13 RX ORDER — LISINOPRIL 20 MG/1
TABLET ORAL
Qty: 30 TABLET | Refills: 11 | Status: SHIPPED | OUTPATIENT
Start: 2021-04-13 | End: 2022-06-06 | Stop reason: SDUPTHER

## 2021-06-01 ENCOUNTER — OFFICE VISIT (OUTPATIENT)
Dept: CARDIOLOGY | Facility: CLINIC | Age: 81
End: 2021-06-01

## 2021-06-01 VITALS
SYSTOLIC BLOOD PRESSURE: 128 MMHG | WEIGHT: 209 LBS | HEART RATE: 68 BPM | DIASTOLIC BLOOD PRESSURE: 80 MMHG | BODY MASS INDEX: 35.68 KG/M2 | HEIGHT: 64 IN

## 2021-06-01 DIAGNOSIS — I10 ESSENTIAL HYPERTENSION: ICD-10-CM

## 2021-06-01 DIAGNOSIS — E11.9 TYPE 2 DIABETES MELLITUS WITHOUT COMPLICATION, WITHOUT LONG-TERM CURRENT USE OF INSULIN (HCC): ICD-10-CM

## 2021-06-01 DIAGNOSIS — I27.20 PHT (PULMONARY HYPERTENSION) (HCC): ICD-10-CM

## 2021-06-01 DIAGNOSIS — E78.00 HYPERCHOLESTEREMIA: Primary | ICD-10-CM

## 2021-06-01 PROCEDURE — 99213 OFFICE O/P EST LOW 20 MIN: CPT | Performed by: NURSE PRACTITIONER

## 2021-06-01 RX ORDER — AMOXICILLIN AND CLAVULANATE POTASSIUM 875; 125 MG/1; MG/1
1 TABLET, FILM COATED ORAL 2 TIMES DAILY
COMMUNITY
End: 2022-07-12

## 2021-06-01 RX ORDER — FLUTICASONE PROPIONATE 50 MCG
2 SPRAY, SUSPENSION (ML) NASAL DAILY PRN
COMMUNITY
End: 2022-07-12

## 2021-06-01 NOTE — PROGRESS NOTES
Chief Complaint   Patient presents with   • Follow-up     For cardiac management. Patient has not been taking aspirin daily, states that if you tell her she needs to then she will. Last lab work was done last month per PCP, not in chart. States that she does have some shortness of breath when she walks up a hill. Currently has a sinus infection.    • Med Refill     PCP does medication refills. Went over medications verbally.      Subjective       Alena Rojo is a 80 y.o. female with hypertension, hyperlipidemia, diabetes, and metabolic syndrome. Cardiac work up in 2010 showed normal coronaries, normal renals with elevated PA pressure. She developed more shortness of breath and fatigue and cardiac work up repeated in 2016 showed possible anterior ischemia. Imdur was added but unable to tolerate secondary to body aches. She has continued to take lasix for bilateral lower extremity edema.       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  She was advised to continue medical management.  Echocardiogram showed LVEF of 65-70%.  No aortic stenosis noted.  Mild MR and mild to moderate TR with RVSP of 42 mmHg noted.     She returns today for regular follow-up. Denies CP, SOB, palpitations, dizziness. She had intermittent dry cough for the last 3-4 weeks, worse at night, being treated for sinuses. She follows gluten free diet which has helped the fatigue. Labs last month with PCP reported by patient as well controlled. On 5/26/2020 BUN/CR 24/1.6, GFR 34, A1c 6.3%, TSH 3.16, , HDL 42, LDL 83, Trigs 216. She is concerned about her  who is developing dementia.          Cardiac History:    Past Surgical History:   Procedure Laterality Date   • CARDIAC CATHETERIZATION  06/18/2010    Normal coronaries, renals. PA-50mmHg   • CARDIOVASCULAR STRESS TEST  06/02/2010    NANY Velasquez- (Dr. John) Anterior & Lateral Ischemia   •  CARDIOVASCULAR STRESS TEST  03/14/2016    L. Myoview- EF 65%, possible anterior ischemia, imdur added   • CARDIOVASCULAR STRESS TEST  09/25/2019    L.Cardiolite- EF > 70%. Small anterior Infarct Vs Breast Attenuation.   • ECHO - CONVERTED  06/02/2010    (Dr. Durant) EF 55%. RVSP-70 mmHg   • ECHO - CONVERTED  03/25/2013    EF 65% RVSP 38 mmHg   • ECHO - CONVERTED  09/25/2019    EF 70%. LA- 4.1 Cm. Mild MR. RVSP- 42 mmHg.   • OTHER SURGICAL HISTORY  07/29/2010    sleep study     Current Outpatient Medications   Medication Sig Dispense Refill   • acetaminophen (TYLENOL) 325 MG tablet Take 650 mg by mouth as needed for mild pain (1-3).     • amoxicillin-clavulanate (AUGMENTIN) 875-125 MG per tablet Take 1 tablet by mouth 2 (Two) Times a Day.     • fluticasone (FLONASE) 50 MCG/ACT nasal spray 2 sprays into the nostril(s) as directed by provider Daily As Needed for Rhinitis.     • lisinopril (PRINIVIL,ZESTRIL) 20 MG tablet TAKE ONE TABLET BY MOUTH DAILY 30 tablet 11   • Loperamide HCl (IMODIUM A-D PO) Take 1 tablet by mouth As Needed.     • NIFEdipine XL (PROCARDIA XL) 60 MG 24 hr tablet Take 60 mg by mouth daily.     • oxybutynin (DITROPAN) 5 MG tablet Take 5 mg by mouth 2 (Two) Times a Day.     • pravastatin (PRAVACHOL) 40 MG tablet Take 40 mg by mouth daily.     • SITagliptin (JANUVIA) 100 MG tablet Take 100 mg by mouth Daily.     • aspirin 81 MG EC tablet Take 81 mg by mouth Daily.       No current facility-administered medications for this visit.     Other    Past Medical History:   Diagnosis Date   • Arthritis    • Celiac disease/sprue    • H/O colonoscopy     2004- 2005 -- no polyps   • H/O total knee replacement     bilateral   • H/O: hysterectomy    • Hx of cholecystectomy 06/01/2016   • Hypercholesterolemia    • Hyperlipidemia    • Hypertension    • Sleep disorder 07/29/2010    Sleep study     Social History     Socioeconomic History   • Marital status:      Spouse name: Not on file   • Number of  "children: Not on file   • Years of education: Not on file   • Highest education level: Not on file   Tobacco Use   • Smoking status: Never Smoker   • Smokeless tobacco: Never Used   Vaping Use   • Vaping Use: Never used   Substance and Sexual Activity   • Alcohol use: No   • Drug use: No     Family History   Problem Relation Age of Onset   • Hypertension Mother    • Cancer Mother    • Heart disease Mother    • Heart attack Father    • Heart disease Father    • Cirrhosis Father    • Hypertension Son    • Hypertension Son      Review of Systems   Constitutional: Positive for weight loss (down 3 lb ). Negative for decreased appetite and malaise/fatigue (improved).   HENT: Negative.    Eyes: Negative for blurred vision.   Cardiovascular: Negative for chest pain, dyspnea on exertion, leg swelling, palpitations and syncope.   Respiratory: Negative for shortness of breath and sleep disturbances due to breathing.    Endocrine: Negative.    Hematologic/Lymphatic: Negative for bleeding problem. Does not bruise/bleed easily.   Skin: Negative.    Musculoskeletal: Negative for falls and myalgias.   Gastrointestinal: Negative for abdominal pain, heartburn and melena.   Genitourinary: Negative for hematuria.   Neurological: Negative for dizziness and light-headedness.   Psychiatric/Behavioral: Negative for altered mental status.   Allergic/Immunologic: Negative.       Objective     /80 (BP Location: Left arm)   Pulse 68   Ht 162 cm (63.78\")   Wt 94.8 kg (209 lb)   BMI 36.12 kg/m²     Vitals and nursing note reviewed.   Constitutional:       General: Not in acute distress.     Appearance: Well-developed. Not diaphoretic.   Eyes:      Pupils: Pupils are equal, round, and reactive to light.   HENT:      Head: Normocephalic.   Pulmonary:      Effort: Pulmonary effort is normal. No respiratory distress.      Breath sounds: Normal breath sounds.   Cardiovascular:      Normal rate. Regular rhythm.      Murmurs: There is a grade " 1/6 systolic murmur at the URSB and apex.   Pulses:     Intact distal pulses.   Edema:     Pretibial: bilateral trace edema of the pretibial area.  Abdominal:      General: Bowel sounds are normal.      Palpations: Abdomen is soft.   Musculoskeletal: Normal range of motion.      Cervical back: Normal range of motion. Skin:     General: Skin is warm and dry.   Neurological:      Mental Status: Alert and oriented to person, place, and time.        Procedures          Problem List Items Addressed This Visit        Cardiac and Vasculature    Hypercholesteremia - Primary    Essential hypertension       Endocrine and Metabolic    Type 2 diabetes mellitus without complication, without long-term current use of insulin (CMS/[facility])       Pulmonary and Pneumonias    PHT (pulmonary hypertension) (CMS/[facility])         1.  Pulmonary hypertension-PA pressure improved to 42 mmHg.  She denies shortness of breath or dyspnea on exertion.  Continue calcium channel blocker and lisinopril.  She declined repeat sleep apnea work-up at this time.     2.  HTN-well-controlled at 128/80.  Continue current therapy.  Limit sodium.  Weight loss. If cough persists, will consider changing lisinopril to losartan.      3.  Hyperlipidemia-LDL well controlled with pravastatin.  Triglycerides elevated at 216.  Encouraged to limit sugar and carbohydrate intake.     4.  Diabetes-managed with Januvia with A1c at goal at 6.3%. Followed by PCP.      No changes made today.  Stress test and echo in 2019 showing no ischemia and normal LV function reviewed with her. Will plan to repeat echo next visit to assess murmur as well as PA pressure.  She appears stable at this time.  Follow-up in 6 months. No refills needed.     Patient's Body mass index is 36.12 kg/m². indicating that she is obese (BMI >30). Obesity-related health conditions include the following: obstructive sleep apnea, hypertension, coronary heart disease, diabetes mellitus and dyslipidemias. Obesity is  improving with lifestyle modifications. BMI is is above average; BMI management plan is completed. We discussed portion control and increasing exercise..               Electronically signed by PIETRO Tran,  June 1, 2021 13:54 EDT

## 2022-06-06 RX ORDER — LISINOPRIL 20 MG/1
20 TABLET ORAL DAILY
Qty: 30 TABLET | Refills: 1 | Status: SHIPPED | OUTPATIENT
Start: 2022-06-06 | End: 2022-07-12 | Stop reason: SDUPTHER

## 2022-06-06 NOTE — TELEPHONE ENCOUNTER
Patient came by and is wanting a refill to be called in to [facility] for her lisinopril.  She has been given an apt for July and is aware she must keep apt because we can only fill till then since she has not been seen in a year.

## 2022-06-06 NOTE — TELEPHONE ENCOUNTER
Script for lisinopril 20 mg daily. 30 tablets and 1 refill is pended to be sent to [facility] in [place] to get patient to appointment on 07/12/22.

## 2022-07-12 ENCOUNTER — OFFICE VISIT (OUTPATIENT)
Dept: CARDIOLOGY | Facility: CLINIC | Age: 82
End: 2022-07-12

## 2022-07-12 VITALS
WEIGHT: 198.4 LBS | BODY MASS INDEX: 35.15 KG/M2 | HEIGHT: 63 IN | SYSTOLIC BLOOD PRESSURE: 122 MMHG | HEART RATE: 66 BPM | DIASTOLIC BLOOD PRESSURE: 70 MMHG

## 2022-07-12 DIAGNOSIS — R01.1 MURMUR, CARDIAC: ICD-10-CM

## 2022-07-12 DIAGNOSIS — I10 ESSENTIAL HYPERTENSION: Primary | ICD-10-CM

## 2022-07-12 DIAGNOSIS — E66.01 SEVERE OBESITY (BMI 35.0-39.9) WITH COMORBIDITY: ICD-10-CM

## 2022-07-12 DIAGNOSIS — E11.9 TYPE 2 DIABETES MELLITUS WITHOUT COMPLICATION, WITHOUT LONG-TERM CURRENT USE OF INSULIN: ICD-10-CM

## 2022-07-12 DIAGNOSIS — E78.00 HYPERCHOLESTEREMIA: ICD-10-CM

## 2022-07-12 PROCEDURE — 99213 OFFICE O/P EST LOW 20 MIN: CPT | Performed by: NURSE PRACTITIONER

## 2022-07-12 RX ORDER — NIFEDIPINE 60 MG/1
60 TABLET, EXTENDED RELEASE ORAL DAILY
Qty: 90 TABLET | Refills: 2 | Status: SHIPPED | OUTPATIENT
Start: 2022-07-12 | End: 2023-01-18 | Stop reason: SDUPTHER

## 2022-07-12 RX ORDER — PRAVASTATIN SODIUM 40 MG
40 TABLET ORAL DAILY
Qty: 90 TABLET | Refills: 2 | Status: SHIPPED | OUTPATIENT
Start: 2022-07-12 | End: 2023-01-18 | Stop reason: SDUPTHER

## 2022-07-12 RX ORDER — LISINOPRIL 20 MG/1
20 TABLET ORAL DAILY
Qty: 90 TABLET | Refills: 2 | Status: SHIPPED | OUTPATIENT
Start: 2022-07-12 | End: 2023-01-18 | Stop reason: SDUPTHER

## 2022-07-12 NOTE — PROGRESS NOTES
Chief Complaint   Patient presents with   • Follow-up     Pt is here for cardiac follow up.  She denies CP, SOB, dizziness or palpitations.  Pt does not take a daily ASA.   • Med Refill     Pt request 90 day refills to be sent to [facility].  Pt verbalized med list during today's OV.   • Lab Work     Pt does not know when her last labs were.  She states it has been at least 6 months ago.       Cardiac Complaints  none      Subjective   Alena Rojo is a 81 y.o. female with hypertension, hyperlipidemia, diabetes, and metabolic syndrome. Cardiac work up in 2010 showed normal coronaries, normal renals with elevated PA pressure. She developed more shortness of breath and fatigue and cardiac work up repeated in 2016 showed possible anterior ischemia. Imdur was added but unable to tolerate secondary to body aches. She has continued to take lasix for bilateral lower extremity edema.  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  She was advised to continue medical management.  Echocardiogram showed LVEF of 65-70%.  No aortic stenosis noted.  Mild MR and mild to moderate TR with RVSP of 42 mmHg noted.    Patient comes today for follow up and denies any new concerns. No CP, SOA, dizziness, palpitations, and syncope denied. Labs she admits have not been done for sometime, she does state she is scheduled to have again soon. She has been without her januvia therapy as AIC has been 6 or below. Refills requested.         Cardiac History  Past Surgical History:   Procedure Laterality Date   • CARDIAC CATHETERIZATION  06/18/2010    Normal coronaries, renals. PA-50mmHg   • CARDIOVASCULAR STRESS TEST  06/02/2010    L. Myoview- (Dr. John) Anterior & Lateral Ischemia   • CARDIOVASCULAR STRESS TEST  03/14/2016    L. Myoview- EF 65%, possible anterior ischemia, imdur added   • CARDIOVASCULAR STRESS TEST  09/25/2019    L.Cardiolite- EF > 70%. Small  anterior Infarct Vs Breast Attenuation.   • ECHO - CONVERTED  06/02/2010    (Dr. Durant) EF 55%. RVSP-70 mmHg   • ECHO - CONVERTED  03/25/2013    EF 65% RVSP 38 mmHg   • ECHO - CONVERTED  09/25/2019    EF 70%. LA- 4.1 Cm. Mild MR. RVSP- 42 mmHg.   • OTHER SURGICAL HISTORY  07/29/2010    sleep study       Current Outpatient Medications   Medication Sig Dispense Refill   • acetaminophen (TYLENOL) 325 MG tablet Take 650 mg by mouth as needed for mild pain (1-3).     • lisinopril (PRINIVIL,ZESTRIL) 20 MG tablet Take 1 tablet by mouth Daily. 90 tablet 2   • NIFEdipine XL (PROCARDIA XL) 60 MG 24 hr tablet Take 1 tablet by mouth Daily. 90 tablet 2   • oxybutynin (DITROPAN) 5 MG tablet Take 5 mg by mouth 2 (Two) Times a Day.     • pravastatin (PRAVACHOL) 40 MG tablet Take 1 tablet by mouth Daily. 90 tablet 2     No current facility-administered medications for this visit.       Other    Past Medical History:   Diagnosis Date   • Arthritis    • Celiac disease/sprue    • H/O colonoscopy     2004- 2005 -- no polyps   • H/O total knee replacement     bilateral   • H/O: hysterectomy    • Hx of cholecystectomy 06/01/2016   • Hypercholesterolemia    • Hyperlipidemia    • Hypertension    • Sleep disorder 07/29/2010    Sleep study       Social History     Socioeconomic History   • Marital status:    Tobacco Use   • Smoking status: Never Smoker   • Smokeless tobacco: Never Used   Vaping Use   • Vaping Use: Never used   Substance and Sexual Activity   • Alcohol use: No   • Drug use: No       Family History   Problem Relation Age of Onset   • Hypertension Mother    • Cancer Mother    • Heart disease Mother    • Heart attack Father    • Heart disease Father    • Cirrhosis Father    • Hypertension Son    • Hypertension Son        Review of Systems   Constitutional: Negative for malaise/fatigue and night sweats.   Cardiovascular: Negative for chest pain, claudication, dyspnea on exertion, irregular heartbeat, leg swelling,  "near-syncope, orthopnea, palpitations and syncope.   Respiratory: Negative for cough, shortness of breath and wheezing.    Musculoskeletal: Positive for stiffness. Negative for back pain and joint pain.   Gastrointestinal: Negative for anorexia, heartburn, melena, nausea and vomiting.   Genitourinary: Negative for dysuria, hematuria, hesitancy and nocturia.   Neurological: Negative for dizziness, headaches and light-headedness.   Psychiatric/Behavioral: Negative for depression and memory loss. The patient is not nervous/anxious.            Objective     /70 (BP Location: Left arm, Patient Position: Sitting)   Pulse 66   Ht 160 cm (63\")   Wt 90 kg (198 lb 6.4 oz)   BMI 35.14 kg/m²     Constitutional:       Appearance: Not in distress.   Eyes:      Pupils: Pupils are equal, round, and reactive to light.   HENT:      Nose: Nose normal.   Pulmonary:      Effort: Pulmonary effort is normal.      Breath sounds: Normal breath sounds.   Cardiovascular:      PMI at left midclavicular line. Normal rate. Regular rhythm.      Murmurs: There is a systolic murmur.   Abdominal:      Palpations: Abdomen is soft.   Musculoskeletal: Normal range of motion.      Cervical back: Normal range of motion and neck supple. Skin:     General: Skin is warm and dry.   Neurological:      Mental Status: Alert and oriented to person, place and time.         Procedures         Diagnoses and all orders for this visit:    1. Essential hypertension (Primary)    2. Hypercholesteremia    3. Murmur, cardiac    4. Type 2 diabetes mellitus without complication, without long-term current use of insulin (HCC)    5. Severe obesity (BMI 35.0-39.9) with comorbidity (HCC)    Other orders  -     lisinopril (PRINIVIL,ZESTRIL) 20 MG tablet; Take 1 tablet by mouth Daily.  Dispense: 90 tablet; Refill: 2  -     NIFEdipine XL (PROCARDIA XL) 60 MG 24 hr tablet; Take 1 tablet by mouth Daily.  Dispense: 90 tablet; Refill: 2  -     pravastatin (PRAVACHOL) 40 MG " tablet; Take 1 tablet by mouth Daily.  Dispense: 90 tablet; Refill: 2             HTN:  Blood pressure stable. No changes to current lisinopril or procardia therapy advised. No cough noted. Limited sodium advised.     Hyperlipidemia:  On statin therapy with pravachol, tolerates well. FLP followed by your office. Can we have for review? Limited carbs, caloric intake, and fried fatty food advised.    Cardiac status:  Stable. No new concerns voiced. No repeat workup advised.    DM:  Diet controlled. Januvia therapy has been held as sugars have been well managed.    Refills per request.    BMI noted at 35.14, good cardiac ADA diet advised     6 month follow up advised or sooner if needed.        Problems Addressed this Visit        Cardiac and Vasculature    Hypercholesteremia    Relevant Medications    pravastatin (PRAVACHOL) 40 MG tablet    Essential hypertension - Primary    Relevant Medications    lisinopril (PRINIVIL,ZESTRIL) 20 MG tablet    NIFEdipine XL (PROCARDIA XL) 60 MG 24 hr tablet    Murmur, cardiac       Endocrine and Metabolic    Type 2 diabetes mellitus without complication, without long-term current use of insulin (HCC)      Other Visit Diagnoses     Severe obesity (BMI 35.0-39.9) with comorbidity (HCC)          Diagnoses       Codes Comments    Essential hypertension    -  Primary ICD-10-CM: I10  ICD-9-CM: 401.9     Hypercholesteremia     ICD-10-CM: E78.00  ICD-9-CM: 272.0     Murmur, cardiac     ICD-10-CM: R01.1  ICD-9-CM: 785.2     Type 2 diabetes mellitus without complication, without long-term current use of insulin (HCC)     ICD-10-CM: E11.9  ICD-9-CM: 250.00     Severe obesity (BMI 35.0-39.9) with comorbidity (HCC)     ICD-10-CM: E66.01  ICD-9-CM: 278.01                     Electronically signed by PIETRO Guerrero July 12, 2022 16:30 EDT

## 2023-01-18 ENCOUNTER — TELEPHONE (OUTPATIENT)
Dept: CARDIOLOGY | Facility: CLINIC | Age: 83
End: 2023-01-18

## 2023-01-18 ENCOUNTER — OFFICE VISIT (OUTPATIENT)
Dept: CARDIOLOGY | Facility: CLINIC | Age: 83
End: 2023-01-18
Payer: MEDICARE

## 2023-01-18 VITALS
BODY MASS INDEX: 33.84 KG/M2 | DIASTOLIC BLOOD PRESSURE: 64 MMHG | SYSTOLIC BLOOD PRESSURE: 110 MMHG | HEART RATE: 54 BPM | HEIGHT: 63 IN | WEIGHT: 191 LBS

## 2023-01-18 DIAGNOSIS — R09.89 PULSE IRREGULARITY: ICD-10-CM

## 2023-01-18 DIAGNOSIS — R42 DIZZINESS: ICD-10-CM

## 2023-01-18 DIAGNOSIS — R00.1 BRADYCARDIA, SINUS: ICD-10-CM

## 2023-01-18 DIAGNOSIS — E11.9 TYPE 2 DIABETES MELLITUS WITHOUT COMPLICATION, WITHOUT LONG-TERM CURRENT USE OF INSULIN: ICD-10-CM

## 2023-01-18 DIAGNOSIS — E66.9 OBESITY (BMI 30.0-34.9): ICD-10-CM

## 2023-01-18 DIAGNOSIS — R00.2 PALPITATIONS: Primary | ICD-10-CM

## 2023-01-18 DIAGNOSIS — I10 ESSENTIAL HYPERTENSION: ICD-10-CM

## 2023-01-18 DIAGNOSIS — E78.00 HYPERCHOLESTEREMIA: ICD-10-CM

## 2023-01-18 PROCEDURE — 99214 OFFICE O/P EST MOD 30 MIN: CPT | Performed by: NURSE PRACTITIONER

## 2023-01-18 RX ORDER — NIFEDIPINE 60 MG/1
60 TABLET, EXTENDED RELEASE ORAL DAILY
Qty: 90 TABLET | Refills: 2 | Status: SHIPPED | OUTPATIENT
Start: 2023-01-18

## 2023-01-18 RX ORDER — LISINOPRIL 20 MG/1
20 TABLET ORAL DAILY
Qty: 90 TABLET | Refills: 2 | Status: SHIPPED | OUTPATIENT
Start: 2023-01-18

## 2023-01-18 RX ORDER — ASPIRIN 81 MG/1
81 TABLET, CHEWABLE ORAL DAILY
COMMUNITY

## 2023-01-18 RX ORDER — PRAVASTATIN SODIUM 40 MG
40 TABLET ORAL DAILY
Qty: 90 TABLET | Refills: 2 | Status: SHIPPED | OUTPATIENT
Start: 2023-01-18

## 2023-01-18 NOTE — TELEPHONE ENCOUNTER
Please let Miss Carvajal know, I am going to advise she wear a monitor. Her HR was regularly irregular and she does have some bouts of dizziness. 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 Please let her know, it will come to her home, so she will not have to leave her  to get it and she can mail it back. The company will call her from [phone] to verify her address. Once she wears it and sends it back and the data is uploaded, I will call with results.

## 2023-01-18 NOTE — PROGRESS NOTES
Chief Complaint   Patient presents with   • Follow-up     Pt is here for cardiac follow up.  Pt denies having chest pains, palpitations, and SOA. Does have random episodes of dizziness. When doing pulse, felt the occasional double heartbeat.      • Med Refill     Pt does need med refills. 90 days preferred.    • LABS     Last labs on file were 2 years. Last were at PCP in December.        Cardiac Complaints  Dizziness, palpitations      Subjective   Alena Rojo is a 82 y.o. female with hypertension, hyperlipidemia, diabetes, and metabolic syndrome. Cardiac work up in 2010 showed normal coronaries, normal renals with elevated PA pressure. She developed more shortness of breath and fatigue and cardiac work up repeated in 2016 showed possible anterior ischemia. Imdur was added but unable to tolerate secondary to body aches. She has continued to take lasix for bilateral lower extremity edema.  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  She was advised to continue medical management.  Echocardiogram showed LVEF of 65-70%.  No aortic stenosis noted.  Mild MR and mild to moderate TR with RVSP of 42 mmHg noted.    She comes for follow up and denies any new concerns. No CP, palpitations, or SOA noted. She does report sometimes checking her pulse and will feel an extra beat. She admits over the weekend she did not feel well, but is sleeping better Labs done in December with PCP, no current available. Refills requested.          Cardiac History  Past Surgical History:   Procedure Laterality Date   • CARDIAC CATHETERIZATION  06/18/2010    Normal coronaries, renals. PA-50mmHg   • CARDIOVASCULAR STRESS TEST  06/02/2010    L. Myoview- (Dr. John) Anterior & Lateral Ischemia   • CARDIOVASCULAR STRESS TEST  03/14/2016    L. Myoview- EF 65%, possible anterior ischemia, imdur added   • CARDIOVASCULAR STRESS TEST  09/25/2019    L.Cardiolite- EF >  70%. Small anterior Infarct Vs Breast Attenuation.   • ECHO - CONVERTED  06/02/2010    (Dr. Durant) EF 55%. RVSP-70 mmHg   • ECHO - CONVERTED  03/25/2013    EF 65% RVSP 38 mmHg   • ECHO - CONVERTED  09/25/2019    EF 70%. LA- 4.1 Cm. Mild MR. RVSP- 42 mmHg.   • OTHER SURGICAL HISTORY  07/29/2010    sleep study       Current Outpatient Medications   Medication Sig Dispense Refill   • acetaminophen (TYLENOL) 325 MG tablet Take 650 mg by mouth as needed for mild pain (1-3).     • aspirin 81 MG chewable tablet Chew 81 mg Daily.     • lisinopril (PRINIVIL,ZESTRIL) 20 MG tablet Take 1 tablet by mouth Daily. 90 tablet 2   • NIFEdipine XL (PROCARDIA XL) 60 MG 24 hr tablet Take 1 tablet by mouth Daily. 90 tablet 2   • oxybutynin (DITROPAN) 5 MG tablet Take 5 mg by mouth 2 (Two) Times a Day.     • pravastatin (PRAVACHOL) 40 MG tablet Take 1 tablet by mouth Daily. 90 tablet 2     No current facility-administered medications for this visit.       Other    Past Medical History:   Diagnosis Date   • Arthritis    • Celiac disease/sprue    • H/O colonoscopy     2004- 2005 -- no polyps   • H/O total knee replacement     bilateral   • H/O: hysterectomy    • Hx of cholecystectomy 06/01/2016   • Hypercholesterolemia    • Hyperlipidemia    • Hypertension    • Sleep disorder 07/29/2010    Sleep study       Social History     Socioeconomic History   • Marital status:    Tobacco Use   • Smoking status: Never   • Smokeless tobacco: Never   Vaping Use   • Vaping Use: Never used   Substance and Sexual Activity   • Alcohol use: No   • Drug use: No   • Sexual activity: Defer       Family History   Problem Relation Age of Onset   • Hypertension Mother    • Cancer Mother    • Heart disease Mother    • Heart attack Father    • Heart disease Father    • Cirrhosis Father    • Hypertension Son    • Hypertension Son        Review of Systems   Constitutional: Negative for malaise/fatigue and night sweats.   Cardiovascular: Positive for  "palpitations. Negative for chest pain, claudication, dyspnea on exertion, irregular heartbeat, leg swelling, near-syncope, orthopnea and syncope.   Respiratory: Negative for cough, shortness of breath and wheezing.    Musculoskeletal: Positive for stiffness. Negative for back pain and joint swelling.   Gastrointestinal: Negative for anorexia, heartburn, nausea and vomiting.   Genitourinary: Negative for dysuria, hematuria, hesitancy and nocturia.   Neurological: Positive for dizziness and light-headedness.   Psychiatric/Behavioral: Negative for depression and memory loss. The patient is not nervous/anxious.            Objective     /64 (BP Location: Left arm, Patient Position: Sitting, Cuff Size: Adult)   Pulse 54   Ht 160 cm (62.99\")   Wt 86.6 kg (191 lb)   BMI 33.84 kg/m²     Constitutional:       Appearance: Not in distress.   Eyes:      Pupils: Pupils are equal, round, and reactive to light.   HENT:      Nose: Nose normal.   Pulmonary:      Effort: Pulmonary effort is normal.      Breath sounds: Normal breath sounds.   Cardiovascular:      PMI at left midclavicular line. Bradycardia present. Regularly irregular rhythm.      Murmurs: There is a systolic murmur.   Abdominal:      Palpations: Abdomen is soft.   Musculoskeletal: Normal range of motion.      Cervical back: Normal range of motion and neck supple. Skin:     General: Skin is warm and dry.   Neurological:      Mental Status: Alert and oriented to person, place and time.         Procedures         Diagnoses and all orders for this visit:    1. Palpitations (Primary)  -     Holter Monitor - 72 Hour Up To 15 Days; Future    2. Bradycardia, sinus  -     Holter Monitor - 72 Hour Up To 15 Days; Future    3. Pulse irregularity  -     Holter Monitor - 72 Hour Up To 15 Days; Future    4. Dizziness  -     Holter Monitor - 72 Hour Up To 15 Days; Future    5. Essential hypertension    6. Hypercholesteremia    7. Type 2 diabetes mellitus without " complication, without long-term current use of insulin (HCC)    8. Obesity (BMI 30.0-34.9)    Other orders  -     lisinopril (PRINIVIL,ZESTRIL) 20 MG tablet; Take 1 tablet by mouth Daily.  Dispense: 90 tablet; Refill: 2  -     NIFEdipine XL (PROCARDIA XL) 60 MG 24 hr tablet; Take 1 tablet by mouth Daily.  Dispense: 90 tablet; Refill: 2  -     pravastatin (PRAVACHOL) 40 MG tablet; Take 1 tablet by mouth Daily.  Dispense: 90 tablet; Refill: 2             HTN:  Blood pressure stable. No changes to current lisinopril or procardia therapy advised. No cough noted. Limited sodium advised.      Hyperlipidemia:  On statin therapy with pravachol, tolerates well. FLP followed by your office. Can we have for review? Limited carbs, caloric intake, and fried fatty food advised.     Cardiac status:  Stable. No new concerns voiced. No repeat workup advised.  ASA continued at same.  If symptoms should recur, she will call for further recommendations.    Pulse irregularity/bradycardia/dizziness: Holter recommended for 3 days to assess for any marked bradycardia, heart block, arrhythmia, or pausing concerns concerns. More recommendations to follow. Labs she admits checked with you recently, can we have for review?     DM:  Diet controlled. Januvia continues to be held. She admits her last AIC was 5.1%. Limited carb diet urged.      Refills per request.     BMI noted at 33.84, good cardiac ADA diet advised, she is down 7 pounds from prior as she has been eating less.      6 month follow up advised or sooner if needed.              Problems Addressed this Visit        Cardiac and Vasculature    Hypercholesteremia    Relevant Medications    pravastatin (PRAVACHOL) 40 MG tablet    Essential hypertension    Relevant Medications    lisinopril (PRINIVIL,ZESTRIL) 20 MG tablet    NIFEdipine XL (PROCARDIA XL) 60 MG 24 hr tablet       Endocrine and Metabolic    Type 2 diabetes mellitus without complication, without long-term current use of  insulin (HCC)   Other Visit Diagnoses     Palpitations    -  Primary    Relevant Orders    Holter Monitor - 72 Hour Up To 15 Days    Bradycardia, sinus        Relevant Medications    NIFEdipine XL (PROCARDIA XL) 60 MG 24 hr tablet    Other Relevant Orders    Holter Monitor - 72 Hour Up To 15 Days    Pulse irregularity        Relevant Orders    Holter Monitor - 72 Hour Up To 15 Days    Dizziness        Relevant Orders    Holter Monitor - 72 Hour Up To 15 Days    Obesity (BMI 30.0-34.9)          Diagnoses       Codes Comments    Palpitations    -  Primary ICD-10-CM: R00.2  ICD-9-CM: 785.1     Bradycardia, sinus     ICD-10-CM: R00.1  ICD-9-CM: 427.89     Pulse irregularity     ICD-10-CM: R09.89  ICD-9-CM: 785.9     Dizziness     ICD-10-CM: R42  ICD-9-CM: 780.4     Essential hypertension     ICD-10-CM: I10  ICD-9-CM: 401.9     Hypercholesteremia     ICD-10-CM: E78.00  ICD-9-CM: 272.0     Type 2 diabetes mellitus without complication, without long-term current use of insulin (HCC)     ICD-10-CM: E11.9  ICD-9-CM: 250.00     Obesity (BMI 30.0-34.9)     ICD-10-CM: E66.9  ICD-9-CM: 278.00                     Electronically signed by Inez Jimenez, PIETRO January 18, 2023 16:26 EST

## 2023-01-18 NOTE — LETTER
January 18, 2023     Magdy Kathleen DO  [address] [place] [id number]    Patient: Alena Rojo   YOB: 1940   Date of Visit: 1/18/2023       Dear Magdy Kathleen DO    Alena Rojo was in my office today. Below is a copy of my note.    If you have questions, please do not hesitate to call me. I look forward to following Alena along with you.         Sincerely,        Inez Jimenez, PIETRO        CC: No Recipients    Chief Complaint   Patient presents with   • Follow-up     Pt is here for cardiac follow up.  Pt denies having chest pains, palpitations, and SOA. Does have random episodes of dizziness. When doing pulse, felt the occasional double heartbeat.      • Med Refill     Pt does need med refills. 90 days preferred.    • LABS     Last labs on file were 2 years. Last were at PCP in December.        Cardiac Complaints  Dizziness, palpitations      Subjective    Alena Rojo is a 82 y.o. female with hypertension, hyperlipidemia, diabetes, and metabolic syndrome. Cardiac work up in 2010 showed normal coronaries, normal renals with elevated PA pressure. She developed more shortness of breath and fatigue and cardiac work up repeated in 2016 showed possible anterior ischemia. Imdur was added but unable to tolerate secondary to body aches. She has continued to take lasix for bilateral lower extremity edema.  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  She was advised to continue medical management.  Echocardiogram showed LVEF of 65-70%.  No aortic stenosis noted.  Mild MR and mild to moderate TR with RVSP of 42 mmHg noted.    She comes for follow up and denies any new concerns. No CP, palpitations, or SOA noted. She does report sometimes checking her pulse and will feel an extra beat. She admits over the weekend she did not feel well, but is sleeping better Labs done in December with PCP, no current  available. Refills requested.          Cardiac History  Past Surgical History:   Procedure Laterality Date   • CARDIAC CATHETERIZATION  06/18/2010    Normal coronaries, renals. PA-50mmHg   • CARDIOVASCULAR STRESS TEST  06/02/2010    L. Myoview- (Dr. John) Anterior & Lateral Ischemia   • CARDIOVASCULAR STRESS TEST  03/14/2016    L. Myoview- EF 65%, possible anterior ischemia, imdur added   • CARDIOVASCULAR STRESS TEST  09/25/2019    L.Cardiolite- EF > 70%. Small anterior Infarct Vs Breast Attenuation.   • ECHO - CONVERTED  06/02/2010    (Dr. Durant) EF 55%. RVSP-70 mmHg   • ECHO - CONVERTED  03/25/2013    EF 65% RVSP 38 mmHg   • ECHO - CONVERTED  09/25/2019    EF 70%. LA- 4.1 Cm. Mild MR. RVSP- 42 mmHg.   • OTHER SURGICAL HISTORY  07/29/2010    sleep study       Current Outpatient Medications   Medication Sig Dispense Refill   • acetaminophen (TYLENOL) 325 MG tablet Take 650 mg by mouth as needed for mild pain (1-3).     • aspirin 81 MG chewable tablet Chew 81 mg Daily.     • lisinopril (PRINIVIL,ZESTRIL) 20 MG tablet Take 1 tablet by mouth Daily. 90 tablet 2   • NIFEdipine XL (PROCARDIA XL) 60 MG 24 hr tablet Take 1 tablet by mouth Daily. 90 tablet 2   • oxybutynin (DITROPAN) 5 MG tablet Take 5 mg by mouth 2 (Two) Times a Day.     • pravastatin (PRAVACHOL) 40 MG tablet Take 1 tablet by mouth Daily. 90 tablet 2     No current facility-administered medications for this visit.       Other    Past Medical History:   Diagnosis Date   • Arthritis    • Celiac disease/sprue    • H/O colonoscopy     2004- 2005 -- no polyps   • H/O total knee replacement     bilateral   • H/O: hysterectomy    • Hx of cholecystectomy 06/01/2016   • Hypercholesterolemia    • Hyperlipidemia    • Hypertension    • Sleep disorder 07/29/2010    Sleep study       Social History     Socioeconomic History   • Marital status:    Tobacco Use   • Smoking status: Never   • Smokeless tobacco: Never   Vaping Use   • Vaping Use: Never used  "  Substance and Sexual Activity   • Alcohol use: No   • Drug use: No   • Sexual activity: Defer       Family History   Problem Relation Age of Onset   • Hypertension Mother    • Cancer Mother    • Heart disease Mother    • Heart attack Father    • Heart disease Father    • Cirrhosis Father    • Hypertension Son    • Hypertension Son        Review of Systems   Constitutional: Negative for malaise/fatigue and night sweats.   Cardiovascular: Positive for palpitations. Negative for chest pain, claudication, dyspnea on exertion, irregular heartbeat, leg swelling, near-syncope, orthopnea and syncope.   Respiratory: Negative for cough, shortness of breath and wheezing.    Musculoskeletal: Positive for stiffness. Negative for back pain and joint swelling.   Gastrointestinal: Negative for anorexia, heartburn, nausea and vomiting.   Genitourinary: Negative for dysuria, hematuria, hesitancy and nocturia.   Neurological: Positive for dizziness and light-headedness.   Psychiatric/Behavioral: Negative for depression and memory loss. The patient is not nervous/anxious.            Objective      /64 (BP Location: Left arm, Patient Position: Sitting, Cuff Size: Adult)   Pulse 54   Ht 160 cm (62.99\")   Wt 86.6 kg (191 lb)   BMI 33.84 kg/m²     Constitutional:       Appearance: Not in distress.   Eyes:      Pupils: Pupils are equal, round, and reactive to light.   HENT:      Nose: Nose normal.   Pulmonary:      Effort: Pulmonary effort is normal.      Breath sounds: Normal breath sounds.   Cardiovascular:      PMI at left midclavicular line. Bradycardia present. Regularly irregular rhythm.      Murmurs: There is a systolic murmur.   Abdominal:      Palpations: Abdomen is soft.   Musculoskeletal: Normal range of motion.      Cervical back: Normal range of motion and neck supple. Skin:     General: Skin is warm and dry.   Neurological:      Mental Status: Alert and oriented to person, place and time. "         Procedures        Diagnoses and all orders for this visit:    1. Palpitations (Primary)  -     Holter Monitor - 72 Hour Up To 15 Days; Future    2. Bradycardia, sinus  -     Holter Monitor - 72 Hour Up To 15 Days; Future    3. Pulse irregularity  -     Holter Monitor - 72 Hour Up To 15 Days; Future    4. Dizziness  -     Holter Monitor - 72 Hour Up To 15 Days; Future    5. Essential hypertension    6. Hypercholesteremia    7. Type 2 diabetes mellitus without complication, without long-term current use of insulin (HCC)    8. Obesity (BMI 30.0-34.9)    Other orders  -     lisinopril (PRINIVIL,ZESTRIL) 20 MG tablet; Take 1 tablet by mouth Daily.  Dispense: 90 tablet; Refill: 2  -     NIFEdipine XL (PROCARDIA XL) 60 MG 24 hr tablet; Take 1 tablet by mouth Daily.  Dispense: 90 tablet; Refill: 2  -     pravastatin (PRAVACHOL) 40 MG tablet; Take 1 tablet by mouth Daily.  Dispense: 90 tablet; Refill: 2            HTN:  Blood pressure stable. No changes to current lisinopril or procardia therapy advised. No cough noted. Limited sodium advised.      Hyperlipidemia:  On statin therapy with pravachol, tolerates well. FLP followed by your office. Can we have for review? Limited carbs, caloric intake, and fried fatty food advised.     Cardiac status:  Stable. No new concerns voiced. No repeat workup advised.  ASA continued at same.  If symptoms should recur, she will call for further recommendations.    Pulse irregularity/bradycardia/dizziness: Holter recommended for 3 days to assess for any marked bradycardia, heart block, arrhythmia, or pausing concerns concerns. More recommendations to follow. Labs she admits checked with you recently, can we have for review?     DM:  Diet controlled. Januvia continues to be held. She admits her last AIC was 5.1%. Limited carb diet urged.      Refills per request.     BMI noted at 33.84, good cardiac ADA diet advised, she is down 7 pounds from prior as she has been eating less.      6  month follow up advised or sooner if needed.              Problems Addressed this Visit        Cardiac and Vasculature    Hypercholesteremia    Relevant Medications    pravastatin (PRAVACHOL) 40 MG tablet    Essential hypertension    Relevant Medications    lisinopril (PRINIVIL,ZESTRIL) 20 MG tablet    NIFEdipine XL (PROCARDIA XL) 60 MG 24 hr tablet       Endocrine and Metabolic    Type 2 diabetes mellitus without complication, without long-term current use of insulin (HCC)   Other Visit Diagnoses     Palpitations    -  Primary    Relevant Orders    Holter Monitor - 72 Hour Up To 15 Days    Bradycardia, sinus        Relevant Medications    NIFEdipine XL (PROCARDIA XL) 60 MG 24 hr tablet    Other Relevant Orders    Holter Monitor - 72 Hour Up To 15 Days    Pulse irregularity        Relevant Orders    Holter Monitor - 72 Hour Up To 15 Days    Dizziness        Relevant Orders    Holter Monitor - 72 Hour Up To 15 Days    Obesity (BMI 30.0-34.9)          Diagnoses       Codes Comments    Palpitations    -  Primary ICD-10-CM: R00.2  ICD-9-CM: 785.1     Bradycardia, sinus     ICD-10-CM: R00.1  ICD-9-CM: 427.89     Pulse irregularity     ICD-10-CM: R09.89  ICD-9-CM: 785.9     Dizziness     ICD-10-CM: R42  ICD-9-CM: 780.4     Essential hypertension     ICD-10-CM: I10  ICD-9-CM: 401.9     Hypercholesteremia     ICD-10-CM: E78.00  ICD-9-CM: 272.0     Type 2 diabetes mellitus without complication, without long-term current use of insulin (HCC)     ICD-10-CM: E11.9  ICD-9-CM: 250.00     Obesity (BMI 30.0-34.9)     ICD-10-CM: E66.9  ICD-9-CM: 278.00                     Electronically signed by Inez Jimenez, APRN January 18, 2023 16:26 EST

## 2023-01-20 NOTE — TELEPHONE ENCOUNTER
Called patient to let her know about the monitor and the reason behind it. She has not been contacted by the company yet but is willing to wear the monitor and to send it back once worn for results.

## 2023-02-06 ENCOUNTER — TELEPHONE (OUTPATIENT)
Dept: CARDIOLOGY | Facility: CLINIC | Age: 83
End: 2023-02-06
Payer: MEDICARE

## 2023-02-06 DIAGNOSIS — I48.0 PAROXYSMAL ATRIAL FIBRILLATION: Primary | ICD-10-CM

## 2023-02-10 ENCOUNTER — CLINICAL SUPPORT (OUTPATIENT)
Dept: CARDIOLOGY | Facility: CLINIC | Age: 83
End: 2023-02-10
Payer: MEDICARE

## 2023-02-10 ENCOUNTER — TELEPHONE (OUTPATIENT)
Dept: CARDIOLOGY | Facility: CLINIC | Age: 83
End: 2023-02-10

## 2023-02-10 DIAGNOSIS — I48.0 PAF (PAROXYSMAL ATRIAL FIBRILLATION): Primary | ICD-10-CM

## 2023-02-10 PROCEDURE — 93000 ELECTROCARDIOGRAM COMPLETE: CPT | Performed by: NURSE PRACTITIONER

## 2023-02-10 NOTE — PROGRESS NOTES
Procedure     ECG 12 Lead    Date/Time: 2/10/2023 10:09 AM  Performed by: Inez Jimenez APRN  Authorized by: Inez Jimenez APRN   Comparison: compared with previous ECG from 3/10/2013  Comparison to previous ECG: Sinus last EKG  Rhythm: sinus bradycardia and atrial fibrillation  BPM: 52    Clinical impression: abnormal EKG  Comments: Normal QT

## 2023-02-14 ENCOUNTER — HOSPITAL ENCOUNTER (OUTPATIENT)
Dept: CARDIOLOGY | Facility: HOSPITAL | Age: 83
Discharge: HOME | End: 2023-02-14
Admitting: NURSE PRACTITIONER
Payer: MEDICARE

## 2023-02-14 VITALS — HEIGHT: 63 IN | BODY MASS INDEX: 33.83 KG/M2 | WEIGHT: 190.92 LBS

## 2023-02-14 DIAGNOSIS — I48.0 PAROXYSMAL ATRIAL FIBRILLATION: ICD-10-CM

## 2023-02-14 PROCEDURE — 93306 TTE W/DOPPLER COMPLETE: CPT

## 2023-02-14 PROCEDURE — 93306 TTE W/DOPPLER COMPLETE: CPT | Performed by: INTERNAL MEDICINE

## 2023-02-15 LAB
BH CV ECHO MEAS - ACS: 1.47 CM
BH CV ECHO MEAS - AO MAX PG: 10.8 MMHG
BH CV ECHO MEAS - AO MEAN PG: 5 MMHG
BH CV ECHO MEAS - AO ROOT DIAM: 2.6 CM
BH CV ECHO MEAS - AO V2 MAX: 164.2 CM/SEC
BH CV ECHO MEAS - AO V2 VTI: 42.1 CM
BH CV ECHO MEAS - AVA(I,D): 2.01 CM2
BH CV ECHO MEAS - EDV(CUBED): 80.9 ML
BH CV ECHO MEAS - EDV(MOD-SP4): 48 ML
BH CV ECHO MEAS - EF(MOD-SP4): 66.5 %
BH CV ECHO MEAS - EF_3D-VOL: 65 %
BH CV ECHO MEAS - ESV(CUBED): 23.4 ML
BH CV ECHO MEAS - ESV(MOD-SP4): 16.1 ML
BH CV ECHO MEAS - FS: 33.9 %
BH CV ECHO MEAS - IVS/LVPW: 0.92 CM
BH CV ECHO MEAS - IVSD: 1.13 CM
BH CV ECHO MEAS - LA A2CS (ATRIAL LENGTH): 6.5 CM
BH CV ECHO MEAS - LA A4C LENGTH: 6.6 CM
BH CV ECHO MEAS - LA DIMENSION: 4.6 CM
BH CV ECHO MEAS - LAT PEAK E' VEL: 6.7 CM/SEC
BH CV ECHO MEAS - LV DIASTOLIC VOL/BSA (35-75): 25.3 CM2
BH CV ECHO MEAS - LV MASS(C)D: 180 GRAMS
BH CV ECHO MEAS - LV MAX PG: 3.5 MMHG
BH CV ECHO MEAS - LV MEAN PG: 1.96 MMHG
BH CV ECHO MEAS - LV SYSTOLIC VOL/BSA (12-30): 8.5 CM2
BH CV ECHO MEAS - LV V1 MAX: 93.4 CM/SEC
BH CV ECHO MEAS - LV V1 VTI: 26.5 CM
BH CV ECHO MEAS - LVIDD: 4.3 CM
BH CV ECHO MEAS - LVIDS: 2.9 CM
BH CV ECHO MEAS - LVOT AREA: 3.2 CM2
BH CV ECHO MEAS - LVOT DIAM: 2.02 CM
BH CV ECHO MEAS - LVPWD: 1.22 CM
BH CV ECHO MEAS - MED PEAK E' VEL: 5.3 CM/SEC
BH CV ECHO MEAS - MR MAX PG: 26.4 MMHG
BH CV ECHO MEAS - MR MAX VEL: 257 CM/SEC
BH CV ECHO MEAS - MV A MAX VEL: 36.2 CM/SEC
BH CV ECHO MEAS - MV DEC SLOPE: 354.4 CM/SEC2
BH CV ECHO MEAS - MV DEC TIME: 0.19 MSEC
BH CV ECHO MEAS - MV E MAX VEL: 108 CM/SEC
BH CV ECHO MEAS - MV E/A: 3
BH CV ECHO MEAS - MV MAX PG: 5.3 MMHG
BH CV ECHO MEAS - MV MEAN PG: 1.31 MMHG
BH CV ECHO MEAS - MV P1/2T: 99 MSEC
BH CV ECHO MEAS - MV V2 VTI: 35.9 CM
BH CV ECHO MEAS - MVA(P1/2T): 2.22 CM2
BH CV ECHO MEAS - MVA(VTI): 2.36 CM2
BH CV ECHO MEAS - PA V2 MAX: 102.4 CM/SEC
BH CV ECHO MEAS - RAP SYSTOLE: 10 MMHG
BH CV ECHO MEAS - RV MAX PG: 2.11 MMHG
BH CV ECHO MEAS - RV V1 MAX: 72.6 CM/SEC
BH CV ECHO MEAS - RV V1 VTI: 20.4 CM
BH CV ECHO MEAS - RVDD: 3.4 CM
BH CV ECHO MEAS - RVSP: 54.4 MMHG
BH CV ECHO MEAS - SI(MOD-SP4): 16.8 ML/M2
BH CV ECHO MEAS - SV(LVOT): 84.8 ML
BH CV ECHO MEAS - SV(MOD-SP4): 31.9 ML
BH CV ECHO MEAS - TAPSE (>1.6): 1.77 CM
BH CV ECHO MEAS - TR MAX PG: 44.4 MMHG
BH CV ECHO MEAS - TR MAX VEL: 333.3 CM/SEC
BH CV ECHO MEASUREMENTS AVERAGE E/E' RATIO: 18
LEFT ATRIUM VOLUME INDEX: 31.4 ML/M2
LEFT ATRIUM VOLUME: 59 ML
MAXIMAL PREDICTED HEART RATE: 137 BPM
STRESS TARGET HR: 116 BPM

## 2023-02-15 NOTE — PROGRESS NOTES
EF 61-65%, no AS, trace MR, RVSP 54mmHg.    Has she had an overnight oximetry or sleep study prior?

## 2023-02-15 NOTE — PROGRESS NOTES
Let her know she can discuss with PCP if she would like to do that,they may be able to do home study.

## 2023-02-15 NOTE — PROGRESS NOTES
"Pt states she takes care of her . He gets up and down a lot through the night. There are times she \"may\" get 5-6 hours of continuous sleep, but not guaranteed. States at times it is late when she goes to bed. Example: last night she didn't get to bed til after 3 AM and was back up before 7 AM. States she will try to do a home test, if necessary, but she can not do any test if required to stay away from home over night. "

## 2023-07-27 ENCOUNTER — OFFICE VISIT (OUTPATIENT)
Dept: CARDIOLOGY | Facility: CLINIC | Age: 83
End: 2023-07-27
Payer: MEDICARE

## 2023-07-27 VITALS
HEART RATE: 50 BPM | DIASTOLIC BLOOD PRESSURE: 86 MMHG | WEIGHT: 194.8 LBS | SYSTOLIC BLOOD PRESSURE: 142 MMHG | BODY MASS INDEX: 35.85 KG/M2 | HEIGHT: 62 IN

## 2023-07-27 DIAGNOSIS — I49.1 PREMATURE ATRIAL CONTRACTIONS: ICD-10-CM

## 2023-07-27 DIAGNOSIS — I10 ESSENTIAL HYPERTENSION: ICD-10-CM

## 2023-07-27 DIAGNOSIS — E66.01 SEVERE OBESITY (BMI 35.0-39.9) WITH COMORBIDITY: ICD-10-CM

## 2023-07-27 DIAGNOSIS — I45.5 SINUS PAUSE: ICD-10-CM

## 2023-07-27 DIAGNOSIS — R00.1 BRADYCARDIA, SINUS: ICD-10-CM

## 2023-07-27 DIAGNOSIS — E11.9 TYPE 2 DIABETES MELLITUS WITHOUT COMPLICATION, WITHOUT LONG-TERM CURRENT USE OF INSULIN: ICD-10-CM

## 2023-07-27 DIAGNOSIS — E78.00 HYPERCHOLESTEREMIA: ICD-10-CM

## 2023-07-27 DIAGNOSIS — I48.0 PAF (PAROXYSMAL ATRIAL FIBRILLATION): Primary | ICD-10-CM

## 2023-07-27 RX ORDER — LISINOPRIL 20 MG/1
20 TABLET ORAL DAILY
Qty: 90 TABLET | Refills: 2 | Status: SHIPPED | OUTPATIENT
Start: 2023-07-27

## 2023-07-27 RX ORDER — PRAVASTATIN SODIUM 40 MG
40 TABLET ORAL DAILY
Qty: 90 TABLET | Refills: 2 | Status: SHIPPED | OUTPATIENT
Start: 2023-07-27

## 2023-07-27 RX ORDER — NIFEDIPINE 60 MG/1
60 TABLET, EXTENDED RELEASE ORAL DAILY
Qty: 90 TABLET | Refills: 2 | Status: SHIPPED | OUTPATIENT
Start: 2023-07-27

## 2023-07-27 NOTE — PROGRESS NOTES
Chief Complaint   Patient presents with    Follow-up     Pt is here for cardiac follow up.  Pt denies CP, SOB, dizziness or palpitations. Pt does not take a daily ASA.  She states since starting sotalol and eliquis she has not felt well.    Med Refill     Pt request 90 day refills to be sent to [facility].  Medications were verified by the pt.      Lab Work     Pt states their last labs were a few months ago with her PCP.         Cardiac Complaints  none      Subjective   Alena Rojo is a 82 y.o. female with hypertension, PAF, hyperlipidemia, diabetes, and metabolic syndrome. Cardiac work up in 2010 showed normal coronaries, normal renals with elevated PA pressure. She developed more shortness of breath and fatigue and cardiac work up repeated in 2016 showed possible anterior ischemia. Imdur was added but unable to tolerate secondary to body aches. She has continued to take lasix for bilateral lower extremity edema.  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  She was advised to continue medical management.  Echocardiogram showed LVEF of 65-70%.  No aortic stenosis noted.  Mild MR and mild to moderate TR with RVSP of 42 mmHg noted. Holter done 1/2023 showed 33% PACs and intermittent runs of afib, sotalol added. She then had an echo with EF of 65%, elevated RVSP, and no sig valve concerns.    She comes today for follow and denies any CP, SOA, dizziness, palpitations, or syncope. She does repot she has been feeling run down with sotalol. She denies any bleed or bruise on eliquis therapy. Refills requested. Labs with PCP, checked a few months ago.            Cardiac History  Past Surgical History:   Procedure Laterality Date    CARDIAC CATHETERIZATION  06/18/2010    Normal coronaries, renals. PA-50mmHg    CARDIOVASCULAR STRESS TEST  06/02/2010    NANY Velasquez- (Dr. John) Anterior & Lateral Ischemia    CARDIOVASCULAR STRESS TEST   03/14/2016    L. Myoview- EF 65%, possible anterior ischemia, imdur added    CARDIOVASCULAR STRESS TEST  09/25/2019    L.Cardiolite- EF > 70%. Small anterior Infarct Vs Breast Attenuation.    CONVERTED (HISTORICAL) HOLTER  02/06/2023    3 Days. Avg 62. 60-96. 5.6% PAC.33% A.Fib    ECHO - CONVERTED  06/02/2010    (Dr. Durant) EF 55%. RVSP-70 mmHg    ECHO - CONVERTED  03/25/2013    EF 65% RVSP 38 mmHg    ECHO - CONVERTED  09/25/2019    EF 70%. LA- 4.1 Cm. Mild MR. RVSP- 42 mmHg.    ECHO - CONVERTED  02/14/2023    EF 65%. LA- 4.6. Diane. Trace-Mild MR. RVSP- 54 mmHg. [place]    OTHER SURGICAL HISTORY  07/29/2010    sleep study       Current Outpatient Medications   Medication Sig Dispense Refill    acetaminophen (TYLENOL) 325 MG tablet Take 2 tablets by mouth As Needed for Mild Pain.      apixaban (ELIQUIS) 5 MG tablet tablet Take 1 tablet by mouth 2 (Two) Times a Day. 160 tablet 11    lisinopril (PRINIVIL,ZESTRIL) 20 MG tablet Take 1 tablet by mouth Daily. 90 tablet 2    NIFEdipine XL (PROCARDIA XL) 60 MG 24 hr tablet Take 1 tablet by mouth Daily. 90 tablet 2    oxybutynin (DITROPAN) 5 MG tablet Take 1 tablet by mouth 2 (Two) Times a Day.      pravastatin (PRAVACHOL) 40 MG tablet Take 1 tablet by mouth Daily. 90 tablet 2     No current facility-administered medications for this visit.       Other    Past Medical History:   Diagnosis Date    Arthritis     Celiac disease/sprue     H/O colonoscopy     2004- 2005 -- no polyps    H/O total knee replacement     bilateral    H/O: hysterectomy     Hx of cholecystectomy 06/01/2016    Hypercholesterolemia     Hyperlipidemia     Hypertension     Sleep disorder 07/29/2010    Sleep study       Social History     Socioeconomic History    Marital status:    Tobacco Use    Smoking status: Never    Smokeless tobacco: Never   Vaping Use    Vaping Use: Never used   Substance and Sexual Activity    Alcohol use: No    Drug use: No    Sexual activity: Defer       Family History  "  Problem Relation Age of Onset    Hypertension Mother     Cancer Mother     Heart disease Mother     Heart attack Father     Heart disease Father     Cirrhosis Father     Hypertension Son     Hypertension Son        Review of Systems   Constitutional: Positive for malaise/fatigue. Negative for night sweats.   Cardiovascular:  Negative for chest pain, claudication, dyspnea on exertion, irregular heartbeat, leg swelling, near-syncope, orthopnea, palpitations and syncope.   Respiratory:  Negative for cough, shortness of breath and wheezing.    Musculoskeletal:  Positive for stiffness. Negative for back pain and joint pain.   Gastrointestinal:  Negative for anorexia, heartburn, nausea and vomiting.   Genitourinary:  Negative for dysuria, hematuria, hesitancy and nocturia.   Neurological:  Negative for dizziness, headaches and light-headedness.   Psychiatric/Behavioral:  Negative for depression and memory loss. The patient is not nervous/anxious.          Objective     /86 (BP Location: Left arm, Patient Position: Sitting)   Pulse 50   Ht 157.5 cm (62\")   Wt 88.4 kg (194 lb 12.8 oz)   BMI 35.63 kg/m²     Constitutional:       Appearance: Frail.   Eyes:      Pupils: Pupils are equal, round, and reactive to light.   HENT:      Nose: Nose normal.   Pulmonary:      Effort: Pulmonary effort is normal.      Breath sounds: Normal breath sounds.   Cardiovascular:      PMI at left midclavicular line. Bradycardia present. Regularly irregular rhythm.      Murmurs: There is a systolic murmur.   Abdominal:      Palpations: Abdomen is soft.   Musculoskeletal: Normal range of motion.      Cervical back: Normal range of motion and neck supple. Skin:     General: Skin is warm and dry.   Neurological:      Mental Status: Alert.         ECG 12 Lead    Date/Time: 7/27/2023 12:48 PM  Performed by: Inez Jimenez APRN  Authorized by: Inez Jimenez APRN   Comparison: compared with previous ECG from 2/10/2023  Similar to " previous ECG  Comparison to previous ECG: Not in afib, rate similar  Rhythm: sinus bradycardia  Ectopy: atrial premature contractions  Ectopy comments: sinus pause x2  BPM: 50             Diagnoses and all orders for this visit:    1. PAF (paroxysmal atrial fibrillation) (Primary)  -     ECG 12 Lead    2. Bradycardia, sinus    3. Premature atrial contractions    4. Sinus pause    5. Essential hypertension    6. Hypercholesteremia    7. Type 2 diabetes mellitus without complication, without long-term current use of insulin    8. Severe obesity (BMI 35.0-39.9) with comorbidity    Other orders  -     apixaban (ELIQUIS) 5 MG tablet tablet; Take 1 tablet by mouth 2 (Two) Times a Day.  Dispense: 160 tablet; Refill: 11  -     lisinopril (PRINIVIL,ZESTRIL) 20 MG tablet; Take 1 tablet by mouth Daily.  Dispense: 90 tablet; Refill: 2  -     NIFEdipine XL (PROCARDIA XL) 60 MG 24 hr tablet; Take 1 tablet by mouth Daily.  Dispense: 90 tablet; Refill: 2  -     pravastatin (PRAVACHOL) 40 MG tablet; Take 1 tablet by mouth Daily.  Dispense: 90 tablet; Refill: 2               PAF: EKG for sotalol showed sinus liana with first degree block, PACs, and sinus pausing x2. We will hold sotalol therapy and have her to come back to clinic in one week for EKG and BP check. She will continue eliquis for elevated CHADSvasc score, bleed and bruise denied. Most recent echo showed EF stable and no sig valve concerns.    HTN:  Blood pressure stable, but mildly elevated. Will continue with lisinopril and procardia at same. Limited sodium advised.      Hyperlipidemia:  On statin therapy with pravachol, tolerates well. FLP followed by your office. Can we have for review? Limited carbs, caloric intake, and fried fatty food advised.     Cardiac status:  Stable. No new concerns voiced. No repeat workup advised.  ASA continued at same.  If symptoms should recur, she will call for further recommendations.     DM:  Diet controlled. Limited carb diet urged.  AIC with your office.     Refills per request.     BMI noted at 35.63, good cardiac ADA diet advised, she is down 7 pounds from prior as she has been eating less.      6 month follow up advised or sooner if needed.         Problems Addressed this Visit          Cardiac and Vasculature    Hypercholesteremia    Relevant Medications    pravastatin (PRAVACHOL) 40 MG tablet    Essential hypertension    Relevant Medications    lisinopril (PRINIVIL,ZESTRIL) 20 MG tablet    NIFEdipine XL (PROCARDIA XL) 60 MG 24 hr tablet       Endocrine and Metabolic    Type 2 diabetes mellitus without complication, without long-term current use of insulin     Other Visit Diagnoses       PAF (paroxysmal atrial fibrillation)    -  Primary    Relevant Medications    NIFEdipine XL (PROCARDIA XL) 60 MG 24 hr tablet    Other Relevant Orders    ECG 12 Lead    Bradycardia, sinus        Relevant Medications    NIFEdipine XL (PROCARDIA XL) 60 MG 24 hr tablet    Premature atrial contractions        Relevant Medications    NIFEdipine XL (PROCARDIA XL) 60 MG 24 hr tablet    Sinus pause        Relevant Medications    NIFEdipine XL (PROCARDIA XL) 60 MG 24 hr tablet    Severe obesity (BMI 35.0-39.9) with comorbidity              Diagnoses         Codes Comments    PAF (paroxysmal atrial fibrillation)    -  Primary ICD-10-CM: I48.0  ICD-9-CM: 427.31     Bradycardia, sinus     ICD-10-CM: R00.1  ICD-9-CM: 427.89     Premature atrial contractions     ICD-10-CM: I49.1  ICD-9-CM: 427.61     Sinus pause     ICD-10-CM: I45.5  ICD-9-CM: 426.6     Essential hypertension     ICD-10-CM: I10  ICD-9-CM: 401.9     Hypercholesteremia     ICD-10-CM: E78.00  ICD-9-CM: 272.0     Type 2 diabetes mellitus without complication, without long-term current use of insulin     ICD-10-CM: E11.9  ICD-9-CM: 250.00     Severe obesity (BMI 35.0-39.9) with comorbidity     ICD-10-CM: E66.01  ICD-9-CM: 278.01                           Electronically signed by PIETRO Guerrero July  27, 2023 16:21 EDT

## 2023-08-03 ENCOUNTER — TELEPHONE (OUTPATIENT)
Dept: CARDIOLOGY | Facility: CLINIC | Age: 83
End: 2023-08-03

## 2023-08-03 ENCOUNTER — CLINICAL SUPPORT (OUTPATIENT)
Dept: CARDIOLOGY | Facility: CLINIC | Age: 83
End: 2023-08-03
Payer: MEDICARE

## 2023-08-03 DIAGNOSIS — I48.0 PAF (PAROXYSMAL ATRIAL FIBRILLATION): Primary | ICD-10-CM

## 2023-08-03 PROCEDURE — 93000 ELECTROCARDIOGRAM COMPLETE: CPT | Performed by: NURSE PRACTITIONER

## 2024-03-07 ENCOUNTER — OFFICE VISIT (OUTPATIENT)
Dept: CARDIOLOGY | Facility: CLINIC | Age: 84
End: 2024-03-07
Payer: MEDICARE

## 2024-03-07 VITALS
BODY MASS INDEX: 37.43 KG/M2 | SYSTOLIC BLOOD PRESSURE: 138 MMHG | HEART RATE: 73 BPM | DIASTOLIC BLOOD PRESSURE: 78 MMHG | HEIGHT: 62 IN | WEIGHT: 203.4 LBS

## 2024-03-07 DIAGNOSIS — G47.19 OTHER HYPERSOMNIA: ICD-10-CM

## 2024-03-07 DIAGNOSIS — R40.0 DAYTIME SLEEPINESS: ICD-10-CM

## 2024-03-07 DIAGNOSIS — I10 ESSENTIAL HYPERTENSION: ICD-10-CM

## 2024-03-07 DIAGNOSIS — I49.1 PREMATURE ATRIAL CONTRACTIONS: ICD-10-CM

## 2024-03-07 DIAGNOSIS — I48.0 PAF (PAROXYSMAL ATRIAL FIBRILLATION): Primary | ICD-10-CM

## 2024-03-07 DIAGNOSIS — E78.00 HYPERCHOLESTEREMIA: ICD-10-CM

## 2024-03-07 RX ORDER — PRAVASTATIN SODIUM 40 MG
40 TABLET ORAL DAILY
Qty: 90 TABLET | Refills: 2 | Status: SHIPPED | OUTPATIENT
Start: 2024-03-07

## 2024-03-07 RX ORDER — HYDROCHLOROTHIAZIDE 12.5 MG/1
12.5 CAPSULE, GELATIN COATED ORAL DAILY
Qty: 90 CAPSULE | Refills: 3 | Status: SHIPPED | OUTPATIENT
Start: 2024-03-07

## 2024-03-07 RX ORDER — LISINOPRIL 20 MG/1
20 TABLET ORAL DAILY
Qty: 90 TABLET | Refills: 2 | Status: SHIPPED | OUTPATIENT
Start: 2024-03-07

## 2024-03-07 RX ORDER — NIFEDIPINE 60 MG/1
60 TABLET, EXTENDED RELEASE ORAL DAILY
Qty: 90 TABLET | Refills: 2 | Status: SHIPPED | OUTPATIENT
Start: 2024-03-07

## 2024-03-07 NOTE — PROGRESS NOTES
Chief Complaint   Patient presents with    Follow-up     Cardiac management    Lab     Labs are monitored per PCP, has appointment next month.     Edema     Having swelling in legs, starts when she gets up in the mornings. Started about 6 months ago. She has been wearing compression socks.     Med Refill     Needs refills on cardiac medications-90 day.        HPI:  VERO Rojo is a 83 y.o. female with hypertension, PAF, hyperlipidemia, diabetes, and metabolic syndrome. Cardiac work up in 2010 showed normal coronaries, normal renals with elevated PA pressure. She developed more shortness of breath and fatigue and cardiac work up repeated in 2016 showed possible anterior ischemia. Imdur was added but unable to tolerate secondary to body aches. She has continued to take lasix for bilateral lower extremity edema.  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  She was advised to continue medical management.  Echocardiogram showed LVEF of 65-70%.  No aortic stenosis noted.  Mild MR and mild to moderate TR with RVSP of 42 mmHg noted. Holter done 1/2023 showed 33% PACs and intermittent runs of afib, sotalol added. She then had an echo with EF of 65%, elevated RVSP, and no sig valve concerns.     She comes today for follow up.Patient denies chest pain, pressure, palpitations, tightness, dizziness, shortness of air. She is reporting daytime sleepiness and has chronic afib rate controlled. She had elevated RSVP and was unable to complete home sleep study a year ago, but her  she took care of has passed recently. She is reporting BLE swelling for which she occasionally wears compression hose. She was started on sotolol last year and could not tolerate as she had bradycardia with 1st degree AV block and pauses on Holter. So sotolol was held at that time. She remains rate controlled and on Eliquis.      Cardiac History:    Past  Surgical History:   Procedure Laterality Date    CARDIAC CATHETERIZATION  06/18/2010    Normal coronaries, renals. PA-50mmHg    CARDIOVASCULAR STRESS TEST  06/02/2010    L. Myoview- (Dr. John) Anterior & Lateral Ischemia    CARDIOVASCULAR STRESS TEST  03/14/2016    L. Myoview- EF 65%, possible anterior ischemia, imdur added    CARDIOVASCULAR STRESS TEST  09/25/2019    L.Cardiolite- EF > 70%. Small anterior Infarct Vs Breast Attenuation.    CONVERTED (HISTORICAL) HOLTER  02/06/2023    3 Days. Avg 62. 60-96. 5.6% PAC.33% A.Fib    ECHO - CONVERTED  06/02/2010    (Dr. Durant) EF 55%. RVSP-70 mmHg    ECHO - CONVERTED  03/25/2013    EF 65% RVSP 38 mmHg    ECHO - CONVERTED  09/25/2019    EF 70%. LA- 4.1 Cm. Mild MR. RVSP- 42 mmHg.    ECHO - CONVERTED  02/14/2023    EF 65%. LA- 4.6. Diane. Trace-Mild MR. RVSP- 54 mmHg. Irene    OTHER SURGICAL HISTORY  07/29/2010    sleep study       Current Outpatient Medications   Medication Sig Dispense Refill    acetaminophen (TYLENOL) 325 MG tablet Take 2 tablets by mouth As Needed for Mild Pain.      apixaban (ELIQUIS) 5 MG tablet tablet Take 1 tablet by mouth 2 (Two) Times a Day. 160 tablet 11    lisinopril (PRINIVIL,ZESTRIL) 20 MG tablet Take 1 tablet by mouth Daily. 90 tablet 2    NIFEdipine XL (PROCARDIA XL) 60 MG 24 hr tablet Take 1 tablet by mouth Daily. 90 tablet 2    oxybutynin (DITROPAN) 5 MG tablet Take 1 tablet by mouth 2 (Two) Times a Day.      pravastatin (PRAVACHOL) 40 MG tablet Take 1 tablet by mouth Daily. 90 tablet 2     No current facility-administered medications for this visit.       Other    Past Medical History:   Diagnosis Date    Arthritis     Celiac disease/sprue     H/O colonoscopy     2004- 2005 -- no polyps    H/O total knee replacement     bilateral    H/O: hysterectomy     Hx of cholecystectomy 06/01/2016    Hypercholesterolemia     Hyperlipidemia     Hypertension     Sleep disorder 07/29/2010    Sleep study       Social History     Socioeconomic History  "   Marital status:    Tobacco Use    Smoking status: Never     Passive exposure: Never    Smokeless tobacco: Never   Vaping Use    Vaping status: Never Used   Substance and Sexual Activity    Alcohol use: No    Drug use: No    Sexual activity: Defer       Family History   Problem Relation Age of Onset    Hypertension Mother     Cancer Mother     Heart disease Mother     Heart attack Father     Heart disease Father     Cirrhosis Father     Hypertension Son     Hypertension Son        Vitals:   /78 (BP Location: Left arm)   Pulse 73   Ht 157.5 cm (62.01\")   Wt 92.3 kg (203 lb 6.4 oz)   BMI 37.19 kg/m²     Physical Exam  Vitals and nursing note reviewed.   Constitutional:       Appearance: She is obese.   Neck:      Vascular: No carotid bruit.   Cardiovascular:      Rate and Rhythm: Normal rate. Rhythm irregularly irregular.      Pulses: Normal pulses.      Heart sounds: Normal heart sounds. No murmur heard.     No friction rub. No gallop.   Pulmonary:      Effort: Pulmonary effort is normal.      Breath sounds: Normal breath sounds and air entry.   Musculoskeletal:      Right lower le+ Edema present.      Left lower le+ Edema present.   Skin:     General: Skin is warm and dry.      Capillary Refill: Capillary refill takes less than 2 seconds.   Neurological:      Mental Status: She is alert and oriented to person, place, and time.       ECG 12 Lead    Date/Time: 3/7/2024 2:45 PM  Performed by: Nati Valentino APRN    Authorized by: Nati Valentino APRN  Comparison: compared with previous ECG   Similar to previous ECG  Rhythm: atrial fibrillation  Rate: normal  BPM: 73    Clinical impression: abnormal EKG         Assessment & Plan     Diagnoses and all orders for this visit:    1. PAF (paroxysmal atrial fibrillation) (Primary)    2. Premature atrial contractions    3. Essential hypertension    4. Hypercholesteremia    PAF/PAC  - In office EKG today revealed atrial fibrillation with a controlled " rate of 73 bpm  - She has remained in A-fib since trying sotalol and unable to tolerate it as it changed her rhythm to sinus bradycardia with AV block and 2 pauses  - She remains on daily Eliquis    HTN/BLE edema  - BP higher and of normal  - Continue current medications as prescribed including lisinopril and nifedipine  - Add HCTZ 12.5 mg for better blood control and improved edema    Hypercholesteremia  - Labs managed with PCP and scheduled for next month  - Continue current statin therapy, pravastatin 40 mg    Home sleep study recommended and ordered due to elevated RVSP and A-fib daytime sleepiness, refill cardiac meds, add HCTZ for BLE edema and better BP control.     Visit Diagnoses:    ICD-10-CM ICD-9-CM   1. PAF (paroxysmal atrial fibrillation)  I48.0 427.31   2. Premature atrial contractions  I49.1 427.61   3. Essential hypertension  I10 401.9   4. Hypercholesteremia  E78.00 272.0       Meds Ordered During Visit:  No orders of the defined types were placed in this encounter.      Follow Up Appointment:   No follow-ups on file.           This document has been electronically signed by PIETRO Lo  March 7, 2024 14:21 EST    Dictated Utilizing Dragon Dictation: Part of this note may be an electronic transcription/translation of spoken language to printed text using the Dragon Dictation System.

## 2024-03-08 ENCOUNTER — TELEPHONE (OUTPATIENT)
Dept: CARDIOLOGY | Facility: CLINIC | Age: 84
End: 2024-03-08
Payer: MEDICARE

## 2024-03-08 DIAGNOSIS — R40.0 DAYTIME SLEEPINESS: Primary | ICD-10-CM

## 2024-03-08 NOTE — TELEPHONE ENCOUNTER
Patient made aware of overnight sleep ox order and instructions. Patient replied in understanding.

## 2024-03-08 NOTE — TELEPHONE ENCOUNTER
Per [facility] guidelines, patient does not qualify for a home sleep study due to patient's epworth sleepiness scale. A score of 10 or higher is needed to qualify a patient for testing. Patient's score was 4.

## 2024-09-12 ENCOUNTER — OFFICE VISIT (OUTPATIENT)
Dept: CARDIOLOGY | Facility: CLINIC | Age: 84
End: 2024-09-12
Payer: MEDICARE

## 2024-09-12 VITALS
DIASTOLIC BLOOD PRESSURE: 62 MMHG | HEART RATE: 61 BPM | SYSTOLIC BLOOD PRESSURE: 112 MMHG | HEIGHT: 62 IN | BODY MASS INDEX: 37.61 KG/M2 | WEIGHT: 204.4 LBS

## 2024-09-12 DIAGNOSIS — I49.1 PREMATURE ATRIAL CONTRACTIONS: ICD-10-CM

## 2024-09-12 DIAGNOSIS — I48.0 PAF (PAROXYSMAL ATRIAL FIBRILLATION): Primary | ICD-10-CM

## 2024-09-12 DIAGNOSIS — E78.00 HYPERCHOLESTEREMIA: ICD-10-CM

## 2024-09-12 DIAGNOSIS — I10 ESSENTIAL HYPERTENSION: ICD-10-CM

## 2024-09-12 DIAGNOSIS — R60.0 EDEMA LEG: ICD-10-CM

## 2024-09-12 RX ORDER — FUROSEMIDE 20 MG
20 TABLET ORAL DAILY
Qty: 90 TABLET | Refills: 3 | Status: SHIPPED | OUTPATIENT
Start: 2024-09-12

## 2024-09-12 RX ORDER — DONEPEZIL HYDROCHLORIDE 10 MG/1
TABLET, FILM COATED ORAL
COMMUNITY
Start: 2024-07-08

## 2024-09-12 NOTE — PROGRESS NOTES
Chief Complaint   Patient presents with    Follow-up     Cardiac management - Patient states she has not been doing that well. Patient states her legs and abdomen have been sore lately. Patient states she also has been swelling in her legs ankles and feet lately. Patient also states she is short of breath lately but only upon exertion. Patient denies any chest pains, or palpitations.     Labs Only     Patient states she has had recent lab work from her PCP Dr. Magdy Kathleen in the last six months.     Med Refill     Patient states she does not think she needs any refills right now but she is unsure.         HPI:  VERO Rojo is a 83 y.o. female with hypertension, PAF, hyperlipidemia, diabetes, and metabolic syndrome. Cardiac work up in 2010 showed normal coronaries, normal renals with elevated PA pressure. She developed more shortness of breath and fatigue and cardiac work up repeated in 2016 showed possible anterior ischemia. Imdur was added but unable to tolerate secondary to body aches. She has continued to take lasix for bilateral lower extremity edema.  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  She was advised to continue medical management.  Echocardiogram showed LVEF of 65-70%.  No aortic stenosis noted.  Mild MR and mild to moderate TR with RVSP of 42 mmHg noted. Holter done 1/2023 showed 33% PACs and intermittent runs of afib, sotalol added. She then had an echo with EF of 65%, elevated RVSP, and no sig valve concerns.     She was started on sotolol last year and could not tolerate as she had bradycardia with 1st degree AV block and pauses on Holter. So sotolol was held at that time. She remains rate controlled and on Eliquis.    She returns today for follow-up visit. She did overnight oxymetry and it was overall normal. Patient denies chest pain, pressure, palpitations, tightness, dizziness. She is reporting BLE  edema and had started HCTZ with no relief. She reports SOB with exertion. Especially uphill.      Cardiac History:    Past Surgical History:   Procedure Laterality Date    CARDIAC CATHETERIZATION  06/18/2010    Normal coronaries, renals. PA-50mmHg    CARDIOVASCULAR STRESS TEST  06/02/2010    L. Myoview- (Dr. John) Anterior & Lateral Ischemia    CARDIOVASCULAR STRESS TEST  03/14/2016    L. Myoview- EF 65%, possible anterior ischemia, imdur added    CARDIOVASCULAR STRESS TEST  09/25/2019    L.Cardiolite- EF > 70%. Small anterior Infarct Vs Breast Attenuation.    CONVERTED (HISTORICAL) HOLTER  02/06/2023    3 Days. Avg 62. 60-96. 5.6% PAC.33% A.Fib    ECHO - CONVERTED  06/02/2010    (Dr. Durant) EF 55%. RVSP-70 mmHg    ECHO - CONVERTED  03/25/2013    EF 65% RVSP 38 mmHg    ECHO - CONVERTED  09/25/2019    EF 70%. LA- 4.1 Cm. Mild MR. RVSP- 42 mmHg.    ECHO - CONVERTED  02/14/2023    EF 65%. LA- 4.6. Diane. Trace-Mild MR. RVSP- 54 mmHg. [place]    OTHER SURGICAL HISTORY  07/29/2010    sleep study       Current Outpatient Medications   Medication Sig Dispense Refill    acetaminophen (TYLENOL) 325 MG tablet Take 2 tablets by mouth As Needed for Mild Pain.      apixaban (ELIQUIS) 5 MG tablet tablet Take 1 tablet by mouth 2 (Two) Times a Day. 160 tablet 11    donepezil (ARICEPT) 10 MG tablet       lisinopril (PRINIVIL,ZESTRIL) 20 MG tablet Take 1 tablet by mouth Daily. 90 tablet 2    NIFEdipine XL (PROCARDIA XL) 60 MG 24 hr tablet Take 1 tablet by mouth Daily. 90 tablet 2    oxybutynin (DITROPAN) 5 MG tablet Take 1 tablet by mouth 2 (Two) Times a Day.      pravastatin (PRAVACHOL) 40 MG tablet Take 1 tablet by mouth Daily. 90 tablet 2    furosemide (LASIX) 20 MG tablet Take 1 tablet by mouth Daily. 90 tablet 3     No current facility-administered medications for this visit.       Other    Past Medical History:   Diagnosis Date    Arthritis     Celiac disease/sprue     H/O colonoscopy     2004- 2005 -- no polyps    H/O total  "knee replacement     bilateral    H/O: hysterectomy     Hx of cholecystectomy 2016    Hypercholesterolemia     Hyperlipidemia     Hypertension     Sleep disorder 2010    Sleep study       Social History     Socioeconomic History    Marital status:    Tobacco Use    Smoking status: Never     Passive exposure: Never    Smokeless tobacco: Never   Vaping Use    Vaping status: Never Used   Substance and Sexual Activity    Alcohol use: No    Drug use: No    Sexual activity: Defer       Family History   Problem Relation Age of Onset    Hypertension Mother     Cancer Mother     Heart disease Mother     Heart attack Father     Heart disease Father     Cirrhosis Father     Hypertension Son     Hypertension Son        Vitals:   /62 (BP Location: Left arm, Patient Position: Sitting, Cuff Size: Adult)   Pulse 61   Ht 157.5 cm (62.01\")   Wt 92.7 kg (204 lb 6.4 oz)   BMI 37.37 kg/m²     Physical Exam  Vitals and nursing note reviewed.   Constitutional:       Appearance: She is obese.   Neck:      Vascular: No carotid bruit.   Cardiovascular:      Rate and Rhythm: Normal rate. Rhythm irregularly irregular.      Pulses: Normal pulses.      Heart sounds: Normal heart sounds. No murmur heard.     No friction rub. No gallop.   Pulmonary:      Effort: Pulmonary effort is normal.      Breath sounds: Normal breath sounds and air entry.   Musculoskeletal:      Right lower le+ Pitting Edema present.      Left lower le+ Pitting Edema present.   Skin:     General: Skin is warm and dry.      Capillary Refill: Capillary refill takes less than 2 seconds.   Neurological:      Mental Status: She is alert and oriented to person, place, and time.         ECG 12 Lead    Date/Time: 2024 2:40 PM  Performed by: Nati Valentino APRN    Authorized by: Nati Valentino APRN  Comparison: compared with previous ECG from 3/7/2024  Similar to previous ECG  Rhythm: atrial fibrillation  BPM: 61    Clinical impression: " abnormal EKG  Comments: Qtc 435 ms           Assessment & Plan     Diagnoses and all orders for this visit:    1. PAF (paroxysmal atrial fibrillation) (Primary)  -     ECG 12 Lead    2. Premature atrial contractions    3. Essential hypertension    4. Hypercholesteremia    5. Edema leg  -     furosemide (LASIX) 20 MG tablet; Take 1 tablet by mouth Daily.  Dispense: 90 tablet; Refill: 3    PAF  - EKG atrial Fib. Rate 61 bpm, normal Qtc. Repeat stress to evaluate for ischemia.   -Continue Eliquis  - She is currently off beta-blocker as she cannot tolerate it for bradycardia and first-degree block    Hypertension  - BP controlled  - Continue lisinopril, nifedipine  -Begin Lasix for BLE edema and will help with blood sugar    Hypercholesteremia  - Labs with PCP  - Continue pravastatin    Leg edema  - Discontinue HCTZ and begin Lasix 20 mg daily  - Recommend compression hose and elevation    No refills needed. Add lasix 20 mg. Stop HCTZ.     Visit Diagnoses:    ICD-10-CM ICD-9-CM   1. PAF (paroxysmal atrial fibrillation)  I48.0 427.31   2. Premature atrial contractions  I49.1 427.61   3. Essential hypertension  I10 401.9   4. Hypercholesteremia  E78.00 272.0   5. Edema leg  R60.0 782.3       Meds Ordered During Visit:  New Medications Ordered This Visit   Medications    furosemide (LASIX) 20 MG tablet     Sig: Take 1 tablet by mouth Daily.     Dispense:  90 tablet     Refill:  3       Follow Up Appointment:   Return in about 6 months (around 3/12/2025), or if symptoms worsen or fail to improve.           This document has been electronically signed by PIETRO Lo  September 12, 2024 16:23 EDT    Dictated Utilizing Dragon Dictation: Part of this note may be an electronic transcription/translation of spoken language to printed text using the Dragon Dictation System.

## 2024-10-22 NOTE — TELEPHONE ENCOUNTER
Rx Refill Note  Requested Prescriptions     Pending Prescriptions Disp Refills    NIFEdipine XL (PROCARDIA XL) 60 MG 24 hr tablet [Pharmacy Med Name: NIFEDIPINE 60MG ER] 90 tablet 1     Sig: TAKE 1 TABLET BY MOUTH EVERY DAY      Last office visit with prescribing clinician: 9/12/2024   Last telemedicine visit with prescribing clinician: Visit date not found   Next office visit with prescribing clinician: 3/27/2025                         Would you like a call back once the refill request has been completed: [] Yes [] No    If the office needs to give you a call back, can they leave a voicemail: [] Yes [] No    Karla Oconnell CMA  10/22/24, 16:09 EDT

## 2024-10-23 RX ORDER — NIFEDIPINE 60 MG/1
60 TABLET, EXTENDED RELEASE ORAL DAILY
Qty: 90 TABLET | Refills: 1 | Status: SHIPPED | OUTPATIENT
Start: 2024-10-23

## 2024-12-16 RX ORDER — LISINOPRIL 20 MG/1
20 TABLET ORAL DAILY
Qty: 90 TABLET | Refills: 1 | Status: SHIPPED | OUTPATIENT
Start: 2024-12-16

## 2025-03-27 ENCOUNTER — OFFICE VISIT (OUTPATIENT)
Dept: CARDIOLOGY | Facility: CLINIC | Age: 85
End: 2025-03-27
Payer: MEDICARE

## 2025-03-27 VITALS
DIASTOLIC BLOOD PRESSURE: 70 MMHG | SYSTOLIC BLOOD PRESSURE: 120 MMHG | HEIGHT: 62 IN | WEIGHT: 204.4 LBS | BODY MASS INDEX: 37.61 KG/M2 | HEART RATE: 75 BPM

## 2025-03-27 DIAGNOSIS — I10 ESSENTIAL HYPERTENSION: ICD-10-CM

## 2025-03-27 DIAGNOSIS — I48.0 PAF (PAROXYSMAL ATRIAL FIBRILLATION): Primary | ICD-10-CM

## 2025-03-27 DIAGNOSIS — R60.0 EDEMA LEG: ICD-10-CM

## 2025-03-27 DIAGNOSIS — E78.00 HYPERCHOLESTEREMIA: ICD-10-CM

## 2025-03-27 PROCEDURE — 99214 OFFICE O/P EST MOD 30 MIN: CPT | Performed by: NURSE PRACTITIONER

## 2025-03-27 PROCEDURE — 93000 ELECTROCARDIOGRAM COMPLETE: CPT | Performed by: NURSE PRACTITIONER

## 2025-03-27 PROCEDURE — 3078F DIAST BP <80 MM HG: CPT | Performed by: NURSE PRACTITIONER

## 2025-03-27 PROCEDURE — 3074F SYST BP LT 130 MM HG: CPT | Performed by: NURSE PRACTITIONER

## 2025-03-27 RX ORDER — PRAVASTATIN SODIUM 40 MG
40 TABLET ORAL DAILY
Qty: 90 TABLET | Refills: 2 | Status: SHIPPED | OUTPATIENT
Start: 2025-03-27

## 2025-03-27 RX ORDER — FUROSEMIDE 20 MG/1
20 TABLET ORAL DAILY
Qty: 90 TABLET | Refills: 2 | Status: SHIPPED | OUTPATIENT
Start: 2025-03-27

## 2025-03-27 RX ORDER — NIFEDIPINE 30 MG/1
30 TABLET, EXTENDED RELEASE ORAL DAILY
COMMUNITY

## 2025-03-27 RX ORDER — OXYBUTYNIN CHLORIDE 15 MG/1
15 TABLET, EXTENDED RELEASE ORAL DAILY
COMMUNITY

## 2025-03-27 RX ORDER — LISINOPRIL 20 MG/1
20 TABLET ORAL DAILY
Qty: 90 TABLET | Refills: 2 | Status: SHIPPED | OUTPATIENT
Start: 2025-03-27

## 2025-03-27 NOTE — PROGRESS NOTES
Chief Complaint   Patient presents with    Follow-up     Cardiac management    Lab     Last labs 1/15/25 per PCP, see chart.    Med Refill     Needs refills on cardiac medications-90 day    Medication     Reports Nifedipine dose was decreased to 30 mg, unaware of why it was decreased.        HPI:  VERO Rojo is a 84 y.o. female with hypertension, PAF, hyperlipidemia, diabetes, and metabolic syndrome. Cardiac work up in 2010 showed normal coronaries, normal renals with elevated PA pressure. She developed more shortness of breath and fatigue and cardiac work up repeated in 2016 showed possible anterior ischemia. Imdur was added but unable to tolerate secondary to body aches. She has continued to take lasix for bilateral lower extremity edema.  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  She was advised to continue medical management.  Echocardiogram showed LVEF of 65-70%.  No aortic stenosis noted.  Mild MR and mild to moderate TR with RVSP of 42 mmHg noted. Holter done 1/2023 showed 33% PACs and intermittent runs of afib, sotalol added. She then had an echo with EF of 65%, elevated RVSP, and no sig valve concerns.     She was started on sotolol last year and could not tolerate as she had bradycardia with 1st degree AV block and pauses on Holter. So sotolol was held at that time. She remains rate controlled and on Eliquis. She did overnight oxymetry and it was overall normal.     She returns today for follow-up visit. Patient denies chest pain, pressure, palpitations, tightness, dizziness, shortness of air.  Labs 1/2025 with PCP and not available for review today.  He states she is doing well    Cardiac History:    Past Surgical History:   Procedure Laterality Date    CARDIAC CATHETERIZATION  06/18/2010    Normal coronaries, renals. PA-50mmHg    CARDIOVASCULAR STRESS TEST  06/02/2010    NANY Velasquez- (Dr. John) Anterior &  Lateral Ischemia    CARDIOVASCULAR STRESS TEST  03/14/2016    L. Myoview- EF 65%, possible anterior ischemia, imdur added    CARDIOVASCULAR STRESS TEST  09/25/2019    L.Cardiolite- EF > 70%. Small anterior Infarct Vs Breast Attenuation.    CONVERTED (HISTORICAL) HOLTER  02/06/2023    3 Days. Avg 62. 60-96. 5.6% PAC.33% A.Fib    ECHO - CONVERTED  06/02/2010    (Dr. Durant) EF 55%. RVSP-70 mmHg    ECHO - CONVERTED  03/25/2013    EF 65% RVSP 38 mmHg    ECHO - CONVERTED  09/25/2019    EF 70%. LA- 4.1 Cm. Mild MR. RVSP- 42 mmHg.    ECHO - CONVERTED  02/14/2023    EF 65%. LA- 4.6. Diane. Trace-Mild MR. RVSP- 54 mmHg. [place]    OTHER SURGICAL HISTORY  07/29/2010    sleep study       Current Outpatient Medications   Medication Sig Dispense Refill    acetaminophen (TYLENOL) 325 MG tablet Take 2 tablets by mouth As Needed for Mild Pain.      apixaban (ELIQUIS) 5 MG tablet tablet Take 1 tablet by mouth 2 (Two) Times a Day. 160 tablet 11    donepezil (ARICEPT) 10 MG tablet       furosemide (LASIX) 20 MG tablet Take 1 tablet by mouth Daily. 90 tablet 2    lisinopril (PRINIVIL,ZESTRIL) 20 MG tablet Take 1 tablet by mouth Daily. 90 tablet 2    NIFEdipine XL (PROCARDIA XL) 30 MG 24 hr tablet Take 1 tablet by mouth Daily.      oxybutynin XL (DITROPAN XL) 15 MG 24 hr tablet Take 1 tablet by mouth Daily.      pravastatin (PRAVACHOL) 40 MG tablet Take 1 tablet by mouth Daily. 90 tablet 2     No current facility-administered medications for this visit.       Other    Past Medical History:   Diagnosis Date    Arthritis     Celiac disease/sprue     H/O colonoscopy     2004- 2005 -- no polyps    H/O total knee replacement     bilateral    H/O: hysterectomy     Hx of cholecystectomy 06/01/2016    Hypercholesterolemia     Hyperlipidemia     Hypertension     Sleep disorder 07/29/2010    Sleep study       Social History     Socioeconomic History    Marital status:    Tobacco Use    Smoking status: Never     Passive exposure: Never     "Smokeless tobacco: Never   Vaping Use    Vaping status: Never Used   Substance and Sexual Activity    Alcohol use: No    Drug use: No    Sexual activity: Defer       Family History   Problem Relation Age of Onset    Hypertension Mother     Cancer Mother     Heart disease Mother     Heart attack Father     Heart disease Father     Cirrhosis Father     Hypertension Son     Hypertension Son        Vitals:   /70 (BP Location: Left arm, Patient Position: Sitting)   Pulse 75   Ht 157.5 cm (62.01\")   Wt 92.7 kg (204 lb 6.4 oz)   BMI 37.38 kg/m²     Physical Exam  Vitals and nursing note reviewed.   Neck:      Vascular: No carotid bruit.   Cardiovascular:      Rate and Rhythm: Normal rate and regular rhythm.      Pulses: Normal pulses.      Heart sounds: Normal heart sounds. No murmur heard.     No friction rub. No gallop.   Pulmonary:      Effort: Pulmonary effort is normal.      Breath sounds: Normal breath sounds and air entry.   Musculoskeletal:      Right lower leg: No edema.      Left lower leg: No edema.   Skin:     General: Skin is warm and dry.      Capillary Refill: Capillary refill takes less than 2 seconds.   Neurological:      Mental Status: She is alert and oriented to person, place, and time.         ECG 12 Lead    Date/Time: 3/27/2025 5:11 PM  Performed by: Nati Valentino APRN    Authorized by: Nati Valentino APRN  Comparison: compared with previous ECG from 9/12/2024  Similar to previous ECG  Rhythm: atrial fibrillation  Rate: normal  BPM: 75    Clinical impression: abnormal EKG  Comments: QTc 464 ms           Assessment & Plan     Diagnoses and all orders for this visit:    1. PAF (paroxysmal atrial fibrillation) (Primary)  -     apixaban (ELIQUIS) 5 MG tablet tablet; Take 1 tablet by mouth 2 (Two) Times a Day.  Dispense: 160 tablet; Refill: 11  -     ECG 12 Lead    2. Essential hypertension  -     lisinopril (PRINIVIL,ZESTRIL) 20 MG tablet; Take 1 tablet by mouth Daily.  Dispense: 90 tablet; " Refill: 2    3. Hypercholesteremia  -     pravastatin (PRAVACHOL) 40 MG tablet; Take 1 tablet by mouth Daily.  Dispense: 90 tablet; Refill: 2    4. Edema leg  -     furosemide (LASIX) 20 MG tablet; Take 1 tablet by mouth Daily.  Dispense: 90 tablet; Refill: 2    PAF  - EKG atrial fibrillation rate 75 bpm normal QTc interval  - Continue Eliquis, Procardia    Hypertension  - BP controlled  - Continue lisinopril, nifedipine    Hypercholesteremia  - Labs with PCP  - Continue pravastatin    Leg edema  - Continue furosemide    Stable from a cardiac standpoint. No further testing recommended at this time. No medication changes made today.  Refill sent to pharmacy    Visit Diagnoses:    ICD-10-CM ICD-9-CM   1. PAF (paroxysmal atrial fibrillation)  I48.0 427.31   2. Essential hypertension  I10 401.9   3. Hypercholesteremia  E78.00 272.0   4. Edema leg  R60.0 782.3       Meds Ordered During Visit:  New Medications Ordered This Visit   Medications    apixaban (ELIQUIS) 5 MG tablet tablet     Sig: Take 1 tablet by mouth 2 (Two) Times a Day.     Dispense:  160 tablet     Refill:  11    furosemide (LASIX) 20 MG tablet     Sig: Take 1 tablet by mouth Daily.     Dispense:  90 tablet     Refill:  2    lisinopril (PRINIVIL,ZESTRIL) 20 MG tablet     Sig: Take 1 tablet by mouth Daily.     Dispense:  90 tablet     Refill:  2     12/11/2024 9:33:37 AM    pravastatin (PRAVACHOL) 40 MG tablet     Sig: Take 1 tablet by mouth Daily.     Dispense:  90 tablet     Refill:  2       Follow Up Appointment:   Return in about 6 months (around 9/27/2025), or if symptoms worsen or fail to improve.           This document has been electronically signed by PIETRO Lo  March 27, 2025 17:15 EDT    Dictated Utilizing Dragon Dictation: Part of this note may be an electronic transcription/translation of spoken language to printed text using the Dragon Dictation System.